# Patient Record
Sex: FEMALE | Race: BLACK OR AFRICAN AMERICAN | NOT HISPANIC OR LATINO | Employment: FULL TIME | URBAN - METROPOLITAN AREA
[De-identification: names, ages, dates, MRNs, and addresses within clinical notes are randomized per-mention and may not be internally consistent; named-entity substitution may affect disease eponyms.]

---

## 2023-02-24 PROBLEM — G89.29 CHRONIC PAIN OF RIGHT KNEE: Status: ACTIVE | Noted: 2023-02-24

## 2023-02-24 PROBLEM — M13.0 POLYARTHROPATHY OR POLYARTHRITIS, HAND: Status: ACTIVE | Noted: 2023-02-24

## 2023-02-24 PROBLEM — G62.9 NEUROPATHY: Status: ACTIVE | Noted: 2023-02-24

## 2023-02-24 PROBLEM — I49.9 IRREGULAR HEART BEATS: Status: ACTIVE | Noted: 2023-02-24

## 2023-02-24 PROBLEM — N04.9 NEPHROTIC SYNDROME: Status: ACTIVE | Noted: 2023-02-24

## 2023-02-24 PROBLEM — M35.00 SJOGREN'S SYNDROME (MULTI): Status: ACTIVE | Noted: 2023-02-24

## 2023-02-24 PROBLEM — R93.89 ENDOMETRIAL THICKENING ON ULTRASOUND: Status: ACTIVE | Noted: 2023-02-24

## 2023-02-24 PROBLEM — R43.0 ANOSMIA: Status: ACTIVE | Noted: 2023-02-24

## 2023-02-24 PROBLEM — H04.123 DRY EYE SYNDROME OF BOTH LACRIMAL GLANDS: Status: ACTIVE | Noted: 2023-02-24

## 2023-02-24 PROBLEM — L65.9 ALOPECIA: Status: ACTIVE | Noted: 2023-02-24

## 2023-02-24 PROBLEM — M25.552 BILATERAL HIP PAIN: Status: ACTIVE | Noted: 2023-02-24

## 2023-02-24 PROBLEM — H92.03 OTALGIA OF BOTH EARS: Status: ACTIVE | Noted: 2023-02-24

## 2023-02-24 PROBLEM — N05.2 MEMBRANOUS GLOMERULONEPHRITIS: Status: ACTIVE | Noted: 2023-02-24

## 2023-02-24 PROBLEM — H52.13 MYOPIA OF BOTH EYES WITH ASTIGMATISM: Status: ACTIVE | Noted: 2023-02-24

## 2023-02-24 PROBLEM — M25.551 BILATERAL HIP PAIN: Status: ACTIVE | Noted: 2023-02-24

## 2023-02-24 PROBLEM — R05.3 CHRONIC COUGH: Status: ACTIVE | Noted: 2023-02-24

## 2023-02-24 PROBLEM — N81.89 PELVIC FLOOR WEAKNESS: Status: ACTIVE | Noted: 2023-02-24

## 2023-02-24 PROBLEM — R35.0 FREQUENCY OF URINATION: Status: ACTIVE | Noted: 2023-02-24

## 2023-02-24 PROBLEM — M32.9 SLE (SYSTEMIC LUPUS ERYTHEMATOSUS) (MULTI): Status: ACTIVE | Noted: 2023-02-24

## 2023-02-24 PROBLEM — N88.4 CERVICAL HYPERTROPHIC ELONGATION: Status: ACTIVE | Noted: 2023-02-24

## 2023-02-24 PROBLEM — M25.561 CHRONIC PAIN OF RIGHT KNEE: Status: ACTIVE | Noted: 2023-02-24

## 2023-02-24 PROBLEM — R29.818 SUSPECTED SLEEP APNEA: Status: ACTIVE | Noted: 2023-02-24

## 2023-02-24 PROBLEM — M46.1 SACROILIITIS (CMS-HCC): Status: ACTIVE | Noted: 2023-02-24

## 2023-02-24 PROBLEM — E66.01 MORBID OBESITY WITH BMI OF 50.0-59.9, ADULT (MULTI): Status: ACTIVE | Noted: 2023-02-24

## 2023-02-24 PROBLEM — G25.81 RESTLESS LEG SYNDROME: Status: ACTIVE | Noted: 2023-02-24

## 2023-02-24 PROBLEM — H52.203 MYOPIA OF BOTH EYES WITH ASTIGMATISM: Status: ACTIVE | Noted: 2023-02-24

## 2023-02-24 PROBLEM — R07.89 CHEST DISCOMFORT: Status: ACTIVE | Noted: 2023-02-24

## 2023-02-24 PROBLEM — E55.9 VITAMIN D DEFICIENCY: Status: ACTIVE | Noted: 2023-02-24

## 2023-02-24 PROBLEM — F32.A DEPRESSION: Status: ACTIVE | Noted: 2023-02-24

## 2023-02-24 PROBLEM — R20.0 BILATERAL HAND NUMBNESS: Status: ACTIVE | Noted: 2023-02-24

## 2023-02-24 RX ORDER — CEVIMELINE HYDROCHLORIDE 30 MG/1
1 CAPSULE ORAL 2 TIMES DAILY
COMMUNITY
Start: 2016-08-11 | End: 2024-02-08 | Stop reason: SDUPTHER

## 2023-02-24 RX ORDER — TOPIRAMATE 100 MG/1
1 TABLET, FILM COATED ORAL NIGHTLY
COMMUNITY
Start: 2022-02-28 | End: 2023-05-31

## 2023-02-24 RX ORDER — SERTRALINE HYDROCHLORIDE 50 MG/1
1 TABLET, FILM COATED ORAL DAILY
COMMUNITY
Start: 2019-05-20 | End: 2023-03-16 | Stop reason: SDUPTHER

## 2023-02-24 RX ORDER — LISINOPRIL 20 MG/1
1 TABLET ORAL DAILY
COMMUNITY
Start: 2016-04-15 | End: 2023-03-16 | Stop reason: SDUPTHER

## 2023-02-24 RX ORDER — HYDROXYCHLOROQUINE SULFATE 200 MG/1
200 TABLET, FILM COATED ORAL 2 TIMES DAILY
COMMUNITY
Start: 2015-11-21 | End: 2024-03-04 | Stop reason: SDUPTHER

## 2023-03-15 NOTE — PROGRESS NOTES
"Subjective   Patient ID: Amanda Beverly is a 42 y.o. female who presents for Annual Exam.    Last Annual Physical: 1 year ago  Last Dental Visit: More than 1 year  Last Eye exam: Within the year  Hearing Concerns: No  Diet: Healthy  Exercise Routine: Regularly. Strength training and cardio     Last PAP: 2019. Cytology and HPV negative   Last Mammogram: Ordered      HPI   The patient is taking lisinopril for her hypertension and topamax for her chronic pain. She is taking these medications as prescribed and is tolerating it well. For her Lupus, sjogren's syndrome and sacroiliitis she is taking Planqinel and Cevimeline. The patient's depression is stable on the current dose of Sertraline. She complains of sleeping difficulty and adds that she normally wakes up during the night and is unable to go back to sleep.     Review of Systems  Constitutional: No fever or chills, No Night Sweats  Eyes: + Blurry Vision, Eye sight problems  ENT: No Nasal Discharge, Hoarseness, sore throat  Cardiovascular: no chest pain, no palpitations and no syncope.   Respiratory: no cough, no shortness of breath during exertion and no shortness of breath at rest.   Gastrointestinal: no abdominal pain, no nausea and no vomiting.   : No vaginal discharge, burning with urination, no blood in urine or stools  Skin: No Skin rashes or Lesions  Neuro: No Headache, no dizziness or Numbness or tingling  Psych: No Anxiety, depression, + sleeping problems  Heme: No Easy bleeding or brusing.     Objective   /77   Pulse 76   Ht 1.702 m (5' 7\")   Wt (!) 157 kg (346 lb)   LMP 03/01/2023 (Approximate)   SpO2 97%   BMI 54.19 kg/m²     Physical Exam  Patient declined Chaperone  Constitutional: Alert and in no acute distress. Well developed, well nourished.   Head and Face: Head and face: Normal.    Eyes: Normal external exam. Pupils were equal in size, round, reactive to light (PERRL) with normal accommodation and extraocular movements intact " (EOMI).   Ears, Nose, Mouth, and Throat: External inspection of ears and nose: Normal.  Hearing: Normal.  Nasal mucosa, septum, and turbinates: Normal.  Lips, teeth, and gums: Normal.  Oropharynx: Normal.   Neck: No neck mass was observed. Supple. Thyroid not enlarged and there were no palpable thyroid nodules.   Cardiovascular: Heart rate and rhythm were normal, normal S1 and S2. Pedal pulses: Normal. No peripheral edema.   Pulmonary: No respiratory distress. Clear bilateral breath sounds.   Breast: Normal Appearance, No Masses or lumps palpated  Abdomen: Soft nontender; no abdominal mass palpated. Normal bowel sounds. No organomegaly.   Musculoskeletal: No joint swelling seen, normal movements of all extremities. Range of motion: Normal.  Muscle strength/tone: Normal.    Skin: Normal skin color and pigmentation, normal skin turgor, and no rash.   Neurologic: Deep tendon reflexes were 2+ and symmetric.   Psychiatric: Judgment and insight: Intact. Mood and affect: Normal.  Lymphatic: No cervical lymphadenopathy. Palpation of lymph nodes in axillae: Normal.  Palpation of lymph nodes in groin: Normal.    Lab Results   Component Value Date    WBC 4.9 01/09/2023    HGB 12.6 01/09/2023    HCT 39.9 01/09/2023     01/09/2023    CHOL 156 09/03/2021    TRIG 118 09/03/2021    HDL 45.0 09/03/2021    ALT 18 01/09/2023    AST 20 01/09/2023     01/09/2023    K 4.4 01/09/2023     01/09/2023    CREATININE 0.98 01/09/2023    BUN 12 01/09/2023    CO2 31 01/09/2023    TSH 2.60 09/03/2021       Electrocardiogram 12 Lead  Normal sinus rhythm  Normal ECG  No previous ECGs available      Assessment/Plan   Diagnoses and all orders for this visit:  Morbid obesity with BMI of 50.0-59.9, adult (CMS/[facility])  -     Hemoglobin A1C; Future  -     Comprehensive Metabolic Panel; Future  -     Lipid Panel; Future  -     TSH with reflex to Free T4 if abnormal; Future  -     Follow Up In [facility] - PCP; Future  Annual  physical exam  Other forms of systemic lupus erythematosus, unspecified organ involvement status (CMS/HCC)  -     CBC; Future  -     Vitamin B12; Future  -     Vitamin D, Total; Future  Sjogren's syndrome with inflammatory arthritis (CMS/HCC)  Sacroiliitis (CMS/HCC)  -     sertraline (Zoloft) 50 mg tablet; Take 1 tablet (50 mg) by mouth once daily.  Other depression  Bilateral hip pain  Breast screening  -     BI mammo bilateral screening tomosynthesis; Future  Nephrotic syndrome  -     Albumin , Urine Random; Future  -     lisinopril 20 mg tablet; Take 1 tablet (20 mg) by mouth once daily.        Dear Amanda Beverly     It was my pleasure to take care of you today in the office. Below are the things we discussed today:    1. 1. Immunizations: Yearly Flu shot is recommended. Up to date         a: COVID: Please get booster from the pharmacy         b: Tetanus: Up-to-date    2. Blood Work: Ordered   3. Seen your dentist twice a year  4. Yearly Eye exam is recommended    5. BMI: Obese  6: Diet recommendations:   Eat Clean, Try to have as many home cooked meals as possible  Avoid processed foods which contain excess calories, sugar, and sodium.    7. Exercise recommendations:   150 minutes a week to maintain your weight     If you have to loose weight, you need a better diet and exercise plan.     8. Supplements recommended:  a - Calcium 600 mg up to twice a day to get a total of 1200 mg. Each 8 oz of milk or yogurt or 1 oz of cheese, 1 Banana, 1 serving of green Leafy vegetable has about 300 mg of Calcium, so you may subtract that amount. Calcium citrate is the only acceptable supplement to take if you take an acid suppressing medication like Prilosec; otherwise Calcium carbonate is acceptable too (It can cause Constipation).   b - Vitamin D - 2000 IU daily     9. Please get your Living will / Advance directive completed if you do not have one already. Please make sure our office has a copy of the latest one.     10.  Mammogram: Ordered  11. PAP: Up to date. Cytology and HPV negative     12. Depression: Continue Sertraline.    13. Hypertension: Continue Lisinopril.    14. Lupus, sjogren's syndrome and sacroiliitis: Continue Plaquenil and Cevimeline.      15. Chronic pain: Continue Topamax.    16. Sleeping issues: Advised the patient to take Melatonin when she wakes up during the night and is unable to fall asleep.    Follow up in 6 months.    Follow up in one year for a Physical. Please call the office before your Physical to see if you need blood work completed prior to your physical.     Please call me if any questions arise from now until your next visit. I will call you after I am done seeing patients. A Doctor is always available by phone when the office is closed. Please feel free to call for help with any problem that you feel shouldn't wait until the office re-opens.     Scribe Attestation  By signing my name below, IElizabeth Scribe   attest that this documentation has been prepared under the direction and in the presence of Bee Arellano MD.

## 2023-03-16 ENCOUNTER — OFFICE VISIT (OUTPATIENT)
Dept: PRIMARY CARE | Facility: CLINIC | Age: 43
End: 2023-03-16
Payer: COMMERCIAL

## 2023-03-16 VITALS
SYSTOLIC BLOOD PRESSURE: 127 MMHG | HEIGHT: 67 IN | HEART RATE: 76 BPM | WEIGHT: 293 LBS | BODY MASS INDEX: 45.99 KG/M2 | OXYGEN SATURATION: 97 % | DIASTOLIC BLOOD PRESSURE: 77 MMHG

## 2023-03-16 DIAGNOSIS — M32.8 OTHER FORMS OF SYSTEMIC LUPUS ERYTHEMATOSUS, UNSPECIFIED ORGAN INVOLVEMENT STATUS (MULTI): ICD-10-CM

## 2023-03-16 DIAGNOSIS — N04.9 NEPHROTIC SYNDROME: ICD-10-CM

## 2023-03-16 DIAGNOSIS — Z12.39 BREAST SCREENING: ICD-10-CM

## 2023-03-16 DIAGNOSIS — Z00.00 ANNUAL PHYSICAL EXAM: ICD-10-CM

## 2023-03-16 DIAGNOSIS — F32.89 OTHER DEPRESSION: ICD-10-CM

## 2023-03-16 DIAGNOSIS — M35.05 SJOGREN'S SYNDROME WITH INFLAMMATORY ARTHRITIS (MULTI): ICD-10-CM

## 2023-03-16 DIAGNOSIS — E66.01 MORBID OBESITY WITH BMI OF 50.0-59.9, ADULT (MULTI): Primary | ICD-10-CM

## 2023-03-16 DIAGNOSIS — M25.552 BILATERAL HIP PAIN: ICD-10-CM

## 2023-03-16 DIAGNOSIS — M46.1 SACROILIITIS (CMS-HCC): ICD-10-CM

## 2023-03-16 DIAGNOSIS — M25.551 BILATERAL HIP PAIN: ICD-10-CM

## 2023-03-16 PROBLEM — R07.89 CHEST DISCOMFORT: Status: RESOLVED | Noted: 2023-02-24 | Resolved: 2023-03-16

## 2023-03-16 PROBLEM — R05.3 CHRONIC COUGH: Status: RESOLVED | Noted: 2023-02-24 | Resolved: 2023-03-16

## 2023-03-16 PROBLEM — I49.9 IRREGULAR HEART BEATS: Status: RESOLVED | Noted: 2023-02-24 | Resolved: 2023-03-16

## 2023-03-16 PROCEDURE — 99396 PREV VISIT EST AGE 40-64: CPT | Performed by: FAMILY MEDICINE

## 2023-03-16 PROCEDURE — 1036F TOBACCO NON-USER: CPT | Performed by: FAMILY MEDICINE

## 2023-03-16 PROCEDURE — 3008F BODY MASS INDEX DOCD: CPT | Performed by: FAMILY MEDICINE

## 2023-03-16 RX ORDER — SERTRALINE HYDROCHLORIDE 50 MG/1
50 TABLET, FILM COATED ORAL DAILY
Qty: 90 TABLET | Refills: 1 | Status: SHIPPED | OUTPATIENT
Start: 2023-03-16 | End: 2023-09-19 | Stop reason: SDUPTHER

## 2023-03-16 RX ORDER — LISINOPRIL 20 MG/1
20 TABLET ORAL DAILY
Qty: 90 TABLET | Refills: 1 | Status: SHIPPED | OUTPATIENT
Start: 2023-03-16 | End: 2023-09-19 | Stop reason: SDUPTHER

## 2023-03-16 ASSESSMENT — PATIENT HEALTH QUESTIONNAIRE - PHQ9
1. LITTLE INTEREST OR PLEASURE IN DOING THINGS: NOT AT ALL
SUM OF ALL RESPONSES TO PHQ9 QUESTIONS 1 AND 2: 0
2. FEELING DOWN, DEPRESSED OR HOPELESS: NOT AT ALL

## 2023-03-16 ASSESSMENT — ENCOUNTER SYMPTOMS
OCCASIONAL FEELINGS OF UNSTEADINESS: 0
LOSS OF SENSATION IN FEET: 0
DEPRESSION: 0

## 2023-03-16 ASSESSMENT — COLUMBIA-SUICIDE SEVERITY RATING SCALE - C-SSRS
1. IN THE PAST MONTH, HAVE YOU WISHED YOU WERE DEAD OR WISHED YOU COULD GO TO SLEEP AND NOT WAKE UP?: NO
6. HAVE YOU EVER DONE ANYTHING, STARTED TO DO ANYTHING, OR PREPARED TO DO ANYTHING TO END YOUR LIFE?: NO
2. HAVE YOU ACTUALLY HAD ANY THOUGHTS OF KILLING YOURSELF?: NO

## 2023-04-13 ENCOUNTER — LAB (OUTPATIENT)
Dept: LAB | Facility: LAB | Age: 43
End: 2023-04-13
Payer: COMMERCIAL

## 2023-04-13 DIAGNOSIS — M32.8 OTHER FORMS OF SYSTEMIC LUPUS ERYTHEMATOSUS, UNSPECIFIED ORGAN INVOLVEMENT STATUS (MULTI): ICD-10-CM

## 2023-04-13 DIAGNOSIS — N04.9 NEPHROTIC SYNDROME: ICD-10-CM

## 2023-04-13 DIAGNOSIS — E66.01 MORBID OBESITY WITH BMI OF 50.0-59.9, ADULT (MULTI): ICD-10-CM

## 2023-04-13 LAB
FIBRIN D-DIMER (NG/ML FEU) IN PLATELET POOR PLASMA: 400 NG/ML FEU
NATRIURETIC PEPTIDE B (PG/ML) IN SER/PLAS: 5 PG/ML (ref 0–99)
TROPONIN I, HIGH SENSITIVITY: <3 NG/L (ref 0–34)

## 2023-04-13 PROCEDURE — 82043 UR ALBUMIN QUANTITATIVE: CPT

## 2023-04-13 PROCEDURE — 82570 ASSAY OF URINE CREATININE: CPT

## 2023-04-13 PROCEDURE — 85027 COMPLETE CBC AUTOMATED: CPT

## 2023-04-13 PROCEDURE — 83036 HEMOGLOBIN GLYCOSYLATED A1C: CPT

## 2023-04-13 PROCEDURE — 82607 VITAMIN B-12: CPT

## 2023-04-13 PROCEDURE — 80061 LIPID PANEL: CPT

## 2023-04-13 PROCEDURE — 80053 COMPREHEN METABOLIC PANEL: CPT

## 2023-04-13 PROCEDURE — 84443 ASSAY THYROID STIM HORMONE: CPT

## 2023-04-13 PROCEDURE — 82306 VITAMIN D 25 HYDROXY: CPT

## 2023-04-13 PROCEDURE — 36415 COLL VENOUS BLD VENIPUNCTURE: CPT

## 2023-04-14 LAB
ALANINE AMINOTRANSFERASE (SGPT) (U/L) IN SER/PLAS: 16 U/L (ref 7–45)
ALBUMIN (G/DL) IN SER/PLAS: 4.1 G/DL (ref 3.4–5)
ALBUMIN (MG/L) IN URINE: 53.2 MG/L
ALBUMIN/CREATININE (UG/MG) IN URINE: 40.9 UG/MG CRT (ref 0–30)
ALKALINE PHOSPHATASE (U/L) IN SER/PLAS: 47 U/L (ref 33–110)
ANION GAP IN SER/PLAS: 14 MMOL/L (ref 10–20)
ASPARTATE AMINOTRANSFERASE (SGOT) (U/L) IN SER/PLAS: 16 U/L (ref 9–39)
BILIRUBIN TOTAL (MG/DL) IN SER/PLAS: 0.8 MG/DL (ref 0–1.2)
CALCIDIOL (25 OH VITAMIN D3) (NG/ML) IN SER/PLAS: 33 NG/ML
CALCIUM (MG/DL) IN SER/PLAS: 9.7 MG/DL (ref 8.6–10.6)
CARBON DIOXIDE, TOTAL (MMOL/L) IN SER/PLAS: 25 MMOL/L (ref 21–32)
CHLORIDE (MMOL/L) IN SER/PLAS: 105 MMOL/L (ref 98–107)
CHOLESTEROL (MG/DL) IN SER/PLAS: 147 MG/DL (ref 0–199)
CHOLESTEROL IN HDL (MG/DL) IN SER/PLAS: 47.5 MG/DL
CHOLESTEROL/HDL RATIO: 3.1
COBALAMIN (VITAMIN B12) (PG/ML) IN SER/PLAS: 670 PG/ML (ref 211–911)
CREATININE (MG/DL) IN SER/PLAS: 0.87 MG/DL (ref 0.5–1.05)
CREATININE (MG/DL) IN URINE: 130 MG/DL (ref 20–320)
ERYTHROCYTE DISTRIBUTION WIDTH (RATIO) BY AUTOMATED COUNT: 11.4 % (ref 11.5–14.5)
ERYTHROCYTE MEAN CORPUSCULAR HEMOGLOBIN CONCENTRATION (G/DL) BY AUTOMATED: 30.9 G/DL (ref 32–36)
ERYTHROCYTE MEAN CORPUSCULAR VOLUME (FL) BY AUTOMATED COUNT: 97 FL (ref 80–100)
ERYTHROCYTES (10*6/UL) IN BLOOD BY AUTOMATED COUNT: 4.4 X10E12/L (ref 4–5.2)
ESTIMATED AVERAGE GLUCOSE FOR HBA1C: 108 MG/DL
GFR FEMALE: 84 ML/MIN/1.73M2
GLUCOSE (MG/DL) IN SER/PLAS: 111 MG/DL (ref 74–99)
HEMATOCRIT (%) IN BLOOD BY AUTOMATED COUNT: 42.7 % (ref 36–46)
HEMOGLOBIN (G/DL) IN BLOOD: 13.2 G/DL (ref 12–16)
HEMOGLOBIN A1C/HEMOGLOBIN TOTAL IN BLOOD: 5.4 %
LDL: 86 MG/DL (ref 0–99)
LEUKOCYTES (10*3/UL) IN BLOOD BY AUTOMATED COUNT: 4.9 X10E9/L (ref 4.4–11.3)
NRBC (PER 100 WBCS) BY AUTOMATED COUNT: 0 /100 WBC (ref 0–0)
PLATELETS (10*3/UL) IN BLOOD AUTOMATED COUNT: 235 X10E9/L (ref 150–450)
POTASSIUM (MMOL/L) IN SER/PLAS: 4.7 MMOL/L (ref 3.5–5.3)
PROTEIN TOTAL: 7.3 G/DL (ref 6.4–8.2)
SODIUM (MMOL/L) IN SER/PLAS: 139 MMOL/L (ref 136–145)
THYROTROPIN (MIU/L) IN SER/PLAS BY DETECTION LIMIT <= 0.05 MIU/L: 1.38 MIU/L (ref 0.44–3.98)
TRIGLYCERIDE (MG/DL) IN SER/PLAS: 69 MG/DL (ref 0–149)
UREA NITROGEN (MG/DL) IN SER/PLAS: 11 MG/DL (ref 6–23)
VLDL: 14 MG/DL (ref 0–40)

## 2023-05-03 ENCOUNTER — TELEPHONE (OUTPATIENT)
Dept: PRIMARY CARE | Facility: CLINIC | Age: 43
End: 2023-05-03

## 2023-05-03 NOTE — TELEPHONE ENCOUNTER
Patient needs a letter of excuse for work for COVID condition. Letter has to be faxed to [phone]; ATTN: Adri Witt. Please advise.

## 2023-05-03 NOTE — LETTER
May 3, 2023    Patient:           Amanda Beverly  YOB: 1980    From [facility]:    Return to work note PATIENTS WITH SUSPECTED or CONFIRMED COVID-19    Amanda FRANK Rush  Date of Evaluation: 04/27/2023    Centers for Disease Control and Prevention (CDC) Criteria to discontinue home isolation and return to work:    FIVE DAYS SINCE SYMPTOMS STARTED    AND    ONE DAY of NO FEVER without the use of fever-reducing medicines like acetaminophen (Tylenol) or Ibuprofen (Motrin or Advil).    AND    Either WITHOUT SYMPTOMS or WITH IMPROVING SYMPTOMS     must continue to wear a mask around other people for a full 10 days, even if allowed to return to work.    The earliest  can return to work is 05/0/2023.    If you are able to test  with antigen tests, and  has two sequential NEGATIVE tests 48 hours apart, then  may remove mask before day 10.     CDC recommends an isolation period of at least 10 days for people who cannot return to work after 5 days and up to 20 days for people who are severely ill with COVID-19 and for the people with weakened immune systems. Consult with your healthcare provider about when you can resume being around other people.    Please check the CDC website for the latest information as the criteria for home quarantine and guidelines for home isolation are changing frequently:     https://www.cdc.gov/coronavirus/2019-ncov/your-health/isolation.html     Bee Arellano MD

## 2023-05-08 ENCOUNTER — TELEPHONE (OUTPATIENT)
Dept: PRIMARY CARE | Facility: CLINIC | Age: 43
End: 2023-05-08

## 2023-05-08 ENCOUNTER — OFFICE VISIT (OUTPATIENT)
Dept: PRIMARY CARE | Facility: CLINIC | Age: 43
End: 2023-05-08
Payer: COMMERCIAL

## 2023-05-08 VITALS
OXYGEN SATURATION: 95 % | SYSTOLIC BLOOD PRESSURE: 129 MMHG | HEART RATE: 84 BPM | BODY MASS INDEX: 45.99 KG/M2 | WEIGHT: 293 LBS | DIASTOLIC BLOOD PRESSURE: 78 MMHG | HEIGHT: 67 IN

## 2023-05-08 DIAGNOSIS — M46.1 SACROILIITIS (CMS-HCC): Primary | ICD-10-CM

## 2023-05-08 DIAGNOSIS — M32.8 OTHER FORMS OF SYSTEMIC LUPUS ERYTHEMATOSUS, UNSPECIFIED ORGAN INVOLVEMENT STATUS (MULTI): ICD-10-CM

## 2023-05-08 PROCEDURE — 1036F TOBACCO NON-USER: CPT | Performed by: FAMILY MEDICINE

## 2023-05-08 PROCEDURE — 99213 OFFICE O/P EST LOW 20 MIN: CPT | Performed by: FAMILY MEDICINE

## 2023-05-08 PROCEDURE — 3008F BODY MASS INDEX DOCD: CPT | Performed by: FAMILY MEDICINE

## 2023-05-08 ASSESSMENT — ENCOUNTER SYMPTOMS
SORE THROAT: 1
NECK PAIN: 0
SINUS PAIN: 0
DEPRESSION: 0
COUGH: 0
OCCASIONAL FEELINGS OF UNSTEADINESS: 0
LOSS OF SENSATION IN FEET: 0

## 2023-05-08 NOTE — LETTER
May 8, 2023     Patient: Amanda Beverly   YOB: 1980   Date of Visit: 5/8/2023       To Whom It May Concern:    Amanda Beverly was seen in my clinic on 5/8/2023 at 12:00 pm. Please excuse  for her absence from work on this day to make the appointment and she still needs some time off and will return to work on 5/12/2023    If you have any questions or concerns, please don't hesitate to call.         Sincerely,         Bee Arellano MD        CC: No Recipients

## 2023-05-08 NOTE — PROGRESS NOTES
"Subjective   Patient ID: Amanda Beverly is a 42 y.o. female who presents for No chief complaint on file..    URI   This is a new problem. The current episode started in the past 7 days. The problem has been gradually improving. There has been no fever. Associated symptoms include a sore throat. Pertinent negatives include no coughing, neck pain, sinus pain or sneezing. She has tried nothing for the symptoms.    Taking Mucinex and feels her chest is better, tired and wants to take few more day off work    Review of Systems  Constitutional: No fever or chills  Cardiovascular: no chest pain, no palpitations and no syncope.   Respiratory: no cough, no shortness of breath during exertion and no shortness of breath at rest.   Gastrointestinal: no abdominal pain, no nausea and no vomiting.  Neuro: No Headache, no dizziness    Objective   /78   Pulse 84   Ht 1.702 m (5' 7\")   Wt (!) 159 kg (350 lb)   SpO2 95%   BMI 54.82 kg/m²     Physical Exam  Constitutional: Alert and in no acute distress. Well developed, well nourished  Head and Face: Head and face: Normal.    Cardiovascular: Heart rate and rhythm were normal, normal S1 and S2. No peripheral edema.   Pulmonary: No respiratory distress. Clear bilateral breath sounds.  Musculoskeletal: Gait and station: Normal. Muscle strength/tone: Normal.   Skin: Normal skin color and pigmentation, normal skin turgor, and no rash.    Psychiatric: Judgment and insight: Intact. Mood and affect: Normal.    Lab Results   Component Value Date    WBC 4.9 04/13/2023    HGB 13.2 04/13/2023    HCT 42.7 04/13/2023     04/13/2023    CHOL 147 04/13/2023    TRIG 69 04/13/2023    HDL 47.5 04/13/2023    ALT 16 04/13/2023    AST 16 04/13/2023     04/13/2023    K 4.7 04/13/2023     04/13/2023    CREATININE 0.87 04/13/2023    BUN 11 04/13/2023    CO2 25 04/13/2023    TSH 1.38 04/13/2023    HGBA1C 5.4 04/13/2023       Electrocardiogram 12 Lead  Normal sinus rhythm  Normal " ECG  No previous ECGs available      Assessment/Plan   Diagnoses and all orders for this visit:  Sacroiliitis (CMS/HCC)  Other forms of systemic lupus erythematosus, unspecified organ involvement status (CMS/[facility])        Dear Amanda Beverly     It was my pleasure to take care of you today in the office. Below are the things we discussed today:    1. COVID infection - recovering well, will consider restarting work in 4 days, will rest in the meantime.     2. SLE - restart Plaquenil and Evoxac.     3. HTN - Continue Lisinopril       Follow up in 5 months     Your yearly Physical is due in: March 2024  When you call the office for your yearly Physical, please ask them to inform me to order your blood work, so that you can get the fasting blood work before your appointment and we can discuss the results at your physical.      Please call me if any questions arise from now until your next visit. I will call you after I am done seeing patients. A Doctor is always available by phone when the office is closed. Please feel free to call for help with any problem that you feel shouldn't wait until the office re-opens.     Bee Arellano MD

## 2023-05-08 NOTE — LETTER
May 12, 2023     Patient: Amanda Beverly   YOB: 1980   Date of Visit: 5/8/2023       To Whom It May Concern:    It is my medical opinion that Amanda Beverly may return to work on but only work from home until 6/1/2023 .    If you have any questions or concerns, please don't hesitate to call.         Sincerely,        Shirley Boyle MA    CC: No Recipients

## 2023-07-19 DIAGNOSIS — G62.9 NEUROPATHY: ICD-10-CM

## 2023-07-19 DIAGNOSIS — M46.1 SACROILIITIS (CMS-HCC): ICD-10-CM

## 2023-07-19 RX ORDER — TOPIRAMATE 100 MG/1
TABLET, FILM COATED ORAL
Qty: 90 TABLET | Refills: 0 | Status: SHIPPED | OUTPATIENT
Start: 2023-07-19 | End: 2023-09-19 | Stop reason: SDUPTHER

## 2023-07-20 ENCOUNTER — TELEPHONE (OUTPATIENT)
Dept: PRIMARY CARE | Facility: CLINIC | Age: 43
End: 2023-07-20

## 2023-07-20 DIAGNOSIS — M32.8 OTHER FORMS OF SYSTEMIC LUPUS ERYTHEMATOSUS, UNSPECIFIED ORGAN INVOLVEMENT STATUS (MULTI): Primary | ICD-10-CM

## 2023-07-20 DIAGNOSIS — M25.551 BILATERAL HIP PAIN: ICD-10-CM

## 2023-07-20 DIAGNOSIS — M46.1 SACROILIITIS (CMS-HCC): ICD-10-CM

## 2023-07-20 DIAGNOSIS — M25.561 CHRONIC PAIN OF RIGHT KNEE: ICD-10-CM

## 2023-07-20 DIAGNOSIS — G89.29 CHRONIC PAIN OF RIGHT KNEE: ICD-10-CM

## 2023-07-20 DIAGNOSIS — M25.552 BILATERAL HIP PAIN: ICD-10-CM

## 2023-09-19 ENCOUNTER — LAB (OUTPATIENT)
Dept: LAB | Facility: LAB | Age: 43
End: 2023-09-19
Payer: COMMERCIAL

## 2023-09-19 ENCOUNTER — OFFICE VISIT (OUTPATIENT)
Dept: PRIMARY CARE | Facility: CLINIC | Age: 43
End: 2023-09-19
Payer: COMMERCIAL

## 2023-09-19 VITALS
BODY MASS INDEX: 45.99 KG/M2 | DIASTOLIC BLOOD PRESSURE: 77 MMHG | HEART RATE: 79 BPM | WEIGHT: 293 LBS | SYSTOLIC BLOOD PRESSURE: 117 MMHG | OXYGEN SATURATION: 94 % | HEIGHT: 67 IN

## 2023-09-19 DIAGNOSIS — M32.8 OTHER FORMS OF SYSTEMIC LUPUS ERYTHEMATOSUS, UNSPECIFIED ORGAN INVOLVEMENT STATUS (MULTI): ICD-10-CM

## 2023-09-19 DIAGNOSIS — F32.89 OTHER DEPRESSION: Primary | ICD-10-CM

## 2023-09-19 DIAGNOSIS — E66.01 MORBID OBESITY WITH BMI OF 50.0-59.9, ADULT (MULTI): ICD-10-CM

## 2023-09-19 DIAGNOSIS — M46.1 SACROILIITIS (CMS-HCC): ICD-10-CM

## 2023-09-19 DIAGNOSIS — N04.9 NEPHROTIC SYNDROME: ICD-10-CM

## 2023-09-19 DIAGNOSIS — G62.9 NEUROPATHY: ICD-10-CM

## 2023-09-19 DIAGNOSIS — Z23 ENCOUNTER FOR IMMUNIZATION: ICD-10-CM

## 2023-09-19 LAB
ALANINE AMINOTRANSFERASE (SGPT) (U/L) IN SER/PLAS: 14 U/L (ref 7–45)
ALBUMIN (G/DL) IN SER/PLAS: 4.1 G/DL (ref 3.4–5)
ALBUMIN (MG/L) IN URINE: 29.6 MG/L
ALBUMIN/CREATININE (UG/MG) IN URINE: 21.1 UG/MG CRT (ref 0–30)
ALKALINE PHOSPHATASE (U/L) IN SER/PLAS: 46 U/L (ref 33–110)
ANION GAP IN SER/PLAS: 12 MMOL/L (ref 10–20)
ASPARTATE AMINOTRANSFERASE (SGOT) (U/L) IN SER/PLAS: 17 U/L (ref 9–39)
BILIRUBIN TOTAL (MG/DL) IN SER/PLAS: 0.7 MG/DL (ref 0–1.2)
C REACTIVE PROTEIN (MG/L) IN SER/PLAS: 0.24 MG/DL
CALCIUM (MG/DL) IN SER/PLAS: 9.8 MG/DL (ref 8.6–10.6)
CARBON DIOXIDE, TOTAL (MMOL/L) IN SER/PLAS: 28 MMOL/L (ref 21–32)
CHLORIDE (MMOL/L) IN SER/PLAS: 106 MMOL/L (ref 98–107)
CREATININE (MG/DL) IN SER/PLAS: 1.09 MG/DL (ref 0.5–1.05)
CREATININE (MG/DL) IN URINE: 140 MG/DL (ref 20–320)
GFR FEMALE: 64 ML/MIN/1.73M2
GLUCOSE (MG/DL) IN SER/PLAS: 95 MG/DL (ref 74–99)
NATRIURETIC PEPTIDE B (PG/ML) IN SER/PLAS: 12 PG/ML (ref 0–99)
POTASSIUM (MMOL/L) IN SER/PLAS: 5 MMOL/L (ref 3.5–5.3)
PROTEIN TOTAL: 7.6 G/DL (ref 6.4–8.2)
SEDIMENTATION RATE, ERYTHROCYTE: 8 MM/H (ref 0–20)
SODIUM (MMOL/L) IN SER/PLAS: 141 MMOL/L (ref 136–145)
UREA NITROGEN (MG/DL) IN SER/PLAS: 16 MG/DL (ref 6–23)

## 2023-09-19 PROCEDURE — 90686 IIV4 VACC NO PRSV 0.5 ML IM: CPT | Performed by: FAMILY MEDICINE

## 2023-09-19 PROCEDURE — 83880 ASSAY OF NATRIURETIC PEPTIDE: CPT

## 2023-09-19 PROCEDURE — 90471 IMMUNIZATION ADMIN: CPT | Performed by: FAMILY MEDICINE

## 2023-09-19 PROCEDURE — 85652 RBC SED RATE AUTOMATED: CPT

## 2023-09-19 PROCEDURE — 3008F BODY MASS INDEX DOCD: CPT | Performed by: FAMILY MEDICINE

## 2023-09-19 PROCEDURE — 80053 COMPREHEN METABOLIC PANEL: CPT

## 2023-09-19 PROCEDURE — 86140 C-REACTIVE PROTEIN: CPT

## 2023-09-19 PROCEDURE — 1036F TOBACCO NON-USER: CPT | Performed by: FAMILY MEDICINE

## 2023-09-19 PROCEDURE — 82570 ASSAY OF URINE CREATININE: CPT

## 2023-09-19 PROCEDURE — 99214 OFFICE O/P EST MOD 30 MIN: CPT | Performed by: FAMILY MEDICINE

## 2023-09-19 PROCEDURE — 36415 COLL VENOUS BLD VENIPUNCTURE: CPT

## 2023-09-19 PROCEDURE — 82043 UR ALBUMIN QUANTITATIVE: CPT

## 2023-09-19 RX ORDER — SERTRALINE HYDROCHLORIDE 50 MG/1
50 TABLET, FILM COATED ORAL DAILY
Qty: 90 TABLET | Refills: 1 | Status: SHIPPED | OUTPATIENT
Start: 2023-09-19 | End: 2024-04-04 | Stop reason: SDUPTHER

## 2023-09-19 RX ORDER — LISINOPRIL 20 MG/1
20 TABLET ORAL DAILY
Qty: 90 TABLET | Refills: 1 | Status: SHIPPED | OUTPATIENT
Start: 2023-09-19 | End: 2024-04-04 | Stop reason: SDUPTHER

## 2023-09-19 RX ORDER — TOPIRAMATE 100 MG/1
100 TABLET, FILM COATED ORAL NIGHTLY
Qty: 90 TABLET | Refills: 1 | Status: SHIPPED | OUTPATIENT
Start: 2023-09-19

## 2023-09-19 NOTE — PROGRESS NOTES
"Subjective   Patient ID: Amanda Beverly is a 43 y.o. female who presents for Follow-up (6 month).    HPI   The patient reports that she is currently taking Plaquenil and Evoxac for lupus and Sjoregen's as prescribed by Dr. Navarro. She is also taking lisinopril for her hypertension, Zoloft for her depression and Topamax for her lower back pain. She is taking these medications as prescribed and is tolerating them well. She complains of bilateral leg swelling.     Review of Systems  Constitutional: No fever or chills  Cardiovascular: no chest pain, no palpitations and no syncope.   Respiratory: no cough, no shortness of breath during exertion and no shortness of breath at rest.   Gastrointestinal: no abdominal pain, no nausea and no vomiting.  Neuro: No Headache, no dizziness  Lymph: + bilateral leg swelling.    Objective   /77   Pulse 79   Ht 1.702 m (5' 7\")   Wt (!) 160 kg (353 lb)   SpO2 94%   BMI 55.29 kg/m²     Physical Exam  Constitutional: Alert and in no acute distress. Well developed, well nourished  Head and Face: Head and face: Normal.    Cardiovascular: Heart rate and rhythm were normal, normal S1 and S2. No peripheral edema.   Pulmonary: No respiratory distress. Clear bilateral breath sounds.  Musculoskeletal: Gait and station: Normal. Muscle strength/tone: Normal.   Skin: Normal skin color and pigmentation, normal skin turgor, and no rash.    Psychiatric: Judgment and insight: Intact. Mood and affect: Normal.    Lab Results   Component Value Date    WBC 4.9 04/13/2023    HGB 13.2 04/13/2023    HCT 42.7 04/13/2023     04/13/2023    CHOL 147 04/13/2023    TRIG 69 04/13/2023    HDL 47.5 04/13/2023    ALT 16 04/13/2023    AST 16 04/13/2023     04/13/2023    K 4.7 04/13/2023     04/13/2023    CREATININE 0.87 04/13/2023    BUN 11 04/13/2023    CO2 25 04/13/2023    TSH 1.38 04/13/2023    HGBA1C 5.4 04/13/2023       Electrocardiogram 12 Lead  Normal sinus rhythm  Normal ECG  No " previous ECGs available      Assessment/Plan   Diagnoses and all orders for this visit:  Other depression  Morbid obesity with BMI of 50.0-59.9, adult (CMS/[facility])  -     Follow Up In [facility] - PCP  Other forms of systemic lupus erythematosus, unspecified organ involvement status (CMS/[facility])  -     B-Type Natriuretic Peptide; Future  -     Comprehensive Metabolic Panel; Future  -     Albumin , Urine Random; Future  -     C-Reactive Protein; Future  -     Sedimentation Rate; Future  Sacroiliitis (CMS/[facility])  -     topiramate (Topamax) 100 mg tablet; Take 1 tablet (100 mg) by mouth once daily at bedtime.  -     sertraline (Zoloft) 50 mg tablet; Take 1 tablet (50 mg) by mouth once daily.  Neuropathy  -     topiramate (Topamax) 100 mg tablet; Take 1 tablet (100 mg) by mouth once daily at bedtime.  -     Follow Up In [facility] - PCP - Health Maintenance; Future  Nephrotic syndrome  -     lisinopril 20 mg tablet; Take 1 tablet (20 mg) by mouth once daily.  Encounter for immunization  -     Flu vaccine (IIV4) age 6 months and greater, preservative free        Dear Amanda Beverly     It was my pleasure to take care of you today in the office. Below are the things we discussed today:    1. SLE and Sjrogen's - Continue Plaquenil and Evoxac.      2. HTN - Continue Lisinopril .    3. Depression: Continue Zoloft.     4. Lower back pain: Continue Topamax.    5. Bilateral leg edema: Recommended that the patient use compression stockings when driving long distances. Letter given for work to allow the patient to work from home on some days due to long drive to and from work. Labs ordered.    6. Flu shot: Given today.    Your yearly Physical is due in: March 2024  When you call the office for your yearly Physical, please ask them to inform me to order your blood work, so that you can get the fasting blood work before your appointment and we can discuss the results at your physical.      Please call me if any  questions arise from now until your next visit. I will call you after I am done seeing patients. A Doctor is always available by phone when the office is closed. Please feel free to call for help with any problem that you feel shouldn't wait until the office re-opens.     Scribe Attestation  By signing my name below, I, Elizabeth Benavides, Bea   attest that this documentation has been prepared under the direction and in the presence of Bee Arellano MD.

## 2023-09-19 NOTE — LETTER
September 19, 2023     Patient: Amanda Beverly   YOB: 1980   Date of Visit: 9/19/2023       To Whom It May Concern:    It is my medical opinion that Amanda Beverly may return to work on 09/20/2023, but she is recommended to avoid dirving for> 20 mins for more than 2 times a week, Please accommodate her to work from home.  .    If you have any questions or concerns, please don't hesitate to call.         Sincerely,        Bee Arellano MD    CC: No Recipients

## 2023-10-13 ENCOUNTER — APPOINTMENT (OUTPATIENT)
Dept: RHEUMATOLOGY | Facility: CLINIC | Age: 43
End: 2023-10-13
Payer: COMMERCIAL

## 2023-10-17 ENCOUNTER — APPOINTMENT (OUTPATIENT)
Dept: RHEUMATOLOGY | Facility: CLINIC | Age: 43
End: 2023-10-17
Payer: COMMERCIAL

## 2023-10-18 ENCOUNTER — TELEMEDICINE (OUTPATIENT)
Dept: RHEUMATOLOGY | Facility: CLINIC | Age: 43
End: 2023-10-18
Payer: COMMERCIAL

## 2023-10-18 DIAGNOSIS — M35.05 SJOGREN'S SYNDROME WITH INFLAMMATORY ARTHRITIS (MULTI): ICD-10-CM

## 2023-10-18 DIAGNOSIS — M32.9 SYSTEMIC LUPUS ERYTHEMATOSUS, UNSPECIFIED SLE TYPE, UNSPECIFIED ORGAN INVOLVEMENT STATUS (MULTI): Primary | ICD-10-CM

## 2023-10-18 PROCEDURE — 99214 OFFICE O/P EST MOD 30 MIN: CPT | Performed by: STUDENT IN AN ORGANIZED HEALTH CARE EDUCATION/TRAINING PROGRAM

## 2023-10-18 RX ORDER — AZATHIOPRINE 50 MG/1
50 TABLET ORAL DAILY
Qty: 30 TABLET | Refills: 11 | Status: SHIPPED | OUTPATIENT
Start: 2023-10-18 | End: 2024-10-17

## 2023-10-18 NOTE — PROGRESS NOTES
"Subjective   Patient ID: Amanda Beverly is a 43 y.o. female who presents for fu due to elevated creatinine on labs and leg swelling that improves with decreasing the amount that she drives    HPI         Female. Renal biopsy 3/31/16 - membranous glomerulonephritis, consistent with class V lupus nephritis. JT 1:80, Anti SSA + (>8.0). Diagnosed in 2014, noticed protein in urine during pregnancy. At that time, patient was treated on steroids. After she was done giving birth, she was put on hydroxchloroquine. . Has had a right knee surgery for patella dislocation and for \" scraping off cartilage\" back in 1999.  Trialed azathioprine in 2023     Current immunosuppression:  Hydroxychloroquine 200 mg BID  Cevimeline BID  azathioprine 50 mg daily     In terms of sxs, she has a lot of fatigue. She is exercising and eating well and trying to lose weight. Sleep is poor. She is starting melatonin, She has less taste and smell since giving birth in 2014.      She has joint pain in her knees, hands. Right knee gives out sometimes. Also has hand stiffness. In the morning, it takes her 15-20 minutes to get up and move. Has alopecia that got worse after diagnosis. She is still actively losing hair. Gets nasal sores. Feels more sluggish in the sun. No malar rash. Has Rayanuds. Sees pain management who put her on topamax for lumbar stenosis.      Has dry eyes and dry mouth. Denies blood clots. has had 2 ectopic pregnancies. Her aunt had lupus.      Past Labs s/f: Positive JT 1:80, SSA>8 rest of NIMISHA neg including dsdna, Sm, RNP, Sm/RNP, SSB, Scl-70, centromere, Sophia-1, chromatin, ribosomal p, normal C3, normal C4, p anca 1:80, MPO neg, PR3 positive to 26, neg PLA2R, neg [id number], DRVVT, cardiolipin, neg Hep B Sag, neg Hep C Ab. TPMT normal     Past Imaging s/f L4-L5 diffuse disc bulge causing moderate spinal canal stenosis and neural foraminal narrowing at L4 an DJD, sacroiliac narrowing and sclerosis potentially c/f sacroillitis, " normal sized kidneys        Review of Systems:  Constitutional: No fever or chills  Cardiovascular: no chest pain, no palpitations and no syncope.   Respiratory: no cough, no shortness of breath during exertion and no shortness of breath at rest.   Gastrointestinal: no abdominal pain, no nausea and no vomiting.  Neuro: No Headache, no dizziness    Physical Exam:   Constitutional: Alert and in no acute distress. Well developed, well nourished.      Lab Results   Component Value Date    WBC 4.9 04/13/2023    HGB 13.2 04/13/2023    HCT 42.7 04/13/2023     04/13/2023    ALT 14 09/19/2023    AST 17 09/19/2023    CREATININE 1.09 (H) 09/19/2023       Electrocardiogram 12 Lead  Normal sinus rhythm  Normal ECG  No previous ECGs available      Assessment/Plan     #SLE  - Renal biopsy 3/31/16 - membranous glomerulonephritis, consistent with class V lupus nephritis. JT 1:80, Anti SSA + (>8.0). Diagnosed in 2014, noticed protein in urine during pregnancy  -continue  mg daily with yearly retinal screening- 1 year refilled on 3/16/2023  -Continue azathioprine 50 mg daily, started 2023-1-year refilled October 2023  -with next labs (can repeat around april or may), will repeat anca panel P anca 1:80 is nonspecific, curious that PR3 was mildly positive  -continue monitoring UA and Pr/Cr  -Has been referred to Derm for SLE alopecia     #Sjogrens Syndrome  -dx by SSA>8, dry eyes, dry mouth  -continue cevimeline- 1 year refilled 3/16/2023  -no B sxs, complements nl  -discussed higher lymphoma risk       #Leg swelling  -Unlikely due to lupus given normal ESR CRP, systemic symptoms, and improvement with decrease driving  -Regarding work note for decreased driving, will defer to PMD unless lupus becomes a etiology for swelling  -We will check lupus specific labs and urine  -If abnormal, will let patient know.  Also let her know if not concerning for lupus  Vaccine Recommendations:     From ACR 2022 vaccine recommendations:      For Rheumatic and musculoskeletal disease (RMD) patients aged greater than or equal to 65 years, and RMD patients aged >18 and <65 years who are on immunosuppressive medication, giving high-dose or adjuvanted influenza vaccination is conditionally  recommended over giving regular-dose influenza vaccination.     For patients with RMD aged <65 years who are on immunosuppressive medication, pneumococcal vaccination is strongly recommended.     For patients with RMD aged >18 years who are on immunosuppressive medication, administering the recombinant zoster vaccine is strongly recommended.     For patients with RMD aged >26 and <45 years who are on immunosuppressive medication and not previously vaccinated, vaccination against HPV is conditionally recommended.     Cardiovascular Recommendations:     Patients with inflammatory diseases are at high risk for cardiovascular disease, and should be aggressively screened by primary care physicians and started on lipid-lowering medications when appropriate.     Bone Health Recommendations:     Patients on steroids should be on calcium and Vitamin D. Patients with inflammatory rheumatic diseases are higher risk for osteoporosis and should have baseline DEXA screening.      Patient counseled to seek medical care if any new or worsening symptoms, urgently if needed.      Note will be d/w primary care doctor.     Return to clinic in1 week sooner if needed     Dragon dictation software was used to dictate this note. Errors may have occurred during dictation that was not intended by the user.               This Medical recommendation has been made based on the Telephonic/Video conversation and the history is given by the patient, which I as a Physician and the patient also understand that there are limitations given the lack of an in-person Physical Exam. Patient will call back if symptoms don't improve.    Adri Navarro MD

## 2023-10-27 ENCOUNTER — TELEPHONE (OUTPATIENT)
Dept: ORTHOPEDIC SURGERY | Facility: CLINIC | Age: 43
End: 2023-10-27
Payer: COMMERCIAL

## 2023-10-27 NOTE — TELEPHONE ENCOUNTER
Work needs another letter for working from home with a date saying she can return in the month of December.

## 2023-10-30 ENCOUNTER — TELEPHONE (OUTPATIENT)
Dept: PRIMARY CARE | Facility: CLINIC | Age: 43
End: 2023-10-30
Payer: COMMERCIAL

## 2024-01-24 ENCOUNTER — HOSPITAL ENCOUNTER (OUTPATIENT)
Dept: RADIOLOGY | Facility: CLINIC | Age: 44
Discharge: HOME | End: 2024-01-24
Payer: COMMERCIAL

## 2024-01-24 ENCOUNTER — OFFICE VISIT (OUTPATIENT)
Dept: RHEUMATOLOGY | Facility: CLINIC | Age: 44
End: 2024-01-24
Payer: COMMERCIAL

## 2024-01-24 ENCOUNTER — LAB (OUTPATIENT)
Dept: LAB | Facility: LAB | Age: 44
End: 2024-01-24
Payer: COMMERCIAL

## 2024-01-24 VITALS — HEART RATE: 77 BPM | SYSTOLIC BLOOD PRESSURE: 109 MMHG | DIASTOLIC BLOOD PRESSURE: 60 MMHG

## 2024-01-24 DIAGNOSIS — Z79.899 LONG-TERM USE OF PLAQUENIL: ICD-10-CM

## 2024-01-24 DIAGNOSIS — M25.551 RIGHT HIP PAIN: ICD-10-CM

## 2024-01-24 DIAGNOSIS — M32.14 SYSTEMIC LUPUS ERYTHEMATOSUS WITH GLOMERULAR DISEASE, UNSPECIFIED SLE TYPE (MULTI): ICD-10-CM

## 2024-01-24 DIAGNOSIS — M32.14 SYSTEMIC LUPUS ERYTHEMATOSUS WITH GLOMERULAR DISEASE, UNSPECIFIED SLE TYPE (MULTI): Primary | ICD-10-CM

## 2024-01-24 LAB
ALBUMIN SERPL BCP-MCNC: 4.3 G/DL (ref 3.4–5)
ALP SERPL-CCNC: 48 U/L (ref 33–110)
ALT SERPL W P-5'-P-CCNC: 10 U/L (ref 7–45)
ANION GAP SERPL CALC-SCNC: 11 MMOL/L (ref 10–20)
APPEARANCE UR: CLEAR
AST SERPL W P-5'-P-CCNC: 13 U/L (ref 9–39)
BASOPHILS # BLD AUTO: 0.02 X10*3/UL (ref 0–0.1)
BASOPHILS NFR BLD AUTO: 0.3 %
BILIRUB SERPL-MCNC: 0.8 MG/DL (ref 0–1.2)
BILIRUB UR STRIP.AUTO-MCNC: NEGATIVE MG/DL
BUN SERPL-MCNC: 17 MG/DL (ref 6–23)
C3 SERPL-MCNC: 155 MG/DL (ref 87–200)
C4 SERPL-MCNC: 63 MG/DL (ref 10–50)
CALCIUM SERPL-MCNC: 9.7 MG/DL (ref 8.6–10.6)
CHLORIDE SERPL-SCNC: 106 MMOL/L (ref 98–107)
CO2 SERPL-SCNC: 25 MMOL/L (ref 21–32)
COLOR UR: YELLOW
CREAT SERPL-MCNC: 1.02 MG/DL (ref 0.5–1.05)
CREAT UR-MCNC: 122.4 MG/DL (ref 20–320)
CRP SERPL-MCNC: 0.5 MG/DL
DSDNA AB SER-ACNC: <1 IU/ML
EGFRCR SERPLBLD CKD-EPI 2021: 70 ML/MIN/1.73M*2
EOSINOPHIL # BLD AUTO: 0.09 X10*3/UL (ref 0–0.7)
EOSINOPHIL NFR BLD AUTO: 1.5 %
ERYTHROCYTE [DISTWIDTH] IN BLOOD BY AUTOMATED COUNT: 11.3 % (ref 11.5–14.5)
ERYTHROCYTE [SEDIMENTATION RATE] IN BLOOD BY WESTERGREN METHOD: 32 MM/H (ref 0–20)
GLUCOSE SERPL-MCNC: 97 MG/DL (ref 74–99)
GLUCOSE UR STRIP.AUTO-MCNC: NEGATIVE MG/DL
HCT VFR BLD AUTO: 40.4 % (ref 36–46)
HGB BLD-MCNC: 13.1 G/DL (ref 12–16)
IMM GRANULOCYTES # BLD AUTO: 0.01 X10*3/UL (ref 0–0.7)
IMM GRANULOCYTES NFR BLD AUTO: 0.2 % (ref 0–0.9)
KETONES UR STRIP.AUTO-MCNC: NEGATIVE MG/DL
LEUKOCYTE ESTERASE UR QL STRIP.AUTO: NEGATIVE
LYMPHOCYTES # BLD AUTO: 2.11 X10*3/UL (ref 1.2–4.8)
LYMPHOCYTES NFR BLD AUTO: 35 %
MCH RBC QN AUTO: 30.1 PG (ref 26–34)
MCHC RBC AUTO-ENTMCNC: 32.4 G/DL (ref 32–36)
MCV RBC AUTO: 93 FL (ref 80–100)
MONOCYTES # BLD AUTO: 0.57 X10*3/UL (ref 0.1–1)
MONOCYTES NFR BLD AUTO: 9.5 %
NEUTROPHILS # BLD AUTO: 3.23 X10*3/UL (ref 1.2–7.7)
NEUTROPHILS NFR BLD AUTO: 53.5 %
NITRITE UR QL STRIP.AUTO: NEGATIVE
NRBC BLD-RTO: 0 /100 WBCS (ref 0–0)
PH UR STRIP.AUTO: 6 [PH]
PLATELET # BLD AUTO: 237 X10*3/UL (ref 150–450)
POTASSIUM SERPL-SCNC: 5.2 MMOL/L (ref 3.5–5.3)
PROT SERPL-MCNC: 7.8 G/DL (ref 6.4–8.2)
PROT UR STRIP.AUTO-MCNC: NEGATIVE MG/DL
PROT UR-ACNC: 11 MG/DL (ref 5–24)
PROT/CREAT UR: 0.09 MG/MG CREAT (ref 0–0.17)
RBC # BLD AUTO: 4.35 X10*6/UL (ref 4–5.2)
RBC # UR STRIP.AUTO: NEGATIVE /UL
SODIUM SERPL-SCNC: 137 MMOL/L (ref 136–145)
SP GR UR STRIP.AUTO: 1.01
UROBILINOGEN UR STRIP.AUTO-MCNC: <2 MG/DL
WBC # BLD AUTO: 6 X10*3/UL (ref 4.4–11.3)

## 2024-01-24 PROCEDURE — 36415 COLL VENOUS BLD VENIPUNCTURE: CPT

## 2024-01-24 PROCEDURE — 86036 ANCA SCREEN EACH ANTIBODY: CPT

## 2024-01-24 PROCEDURE — 84156 ASSAY OF PROTEIN URINE: CPT

## 2024-01-24 PROCEDURE — 85025 COMPLETE CBC W/AUTO DIFF WBC: CPT

## 2024-01-24 PROCEDURE — 1036F TOBACCO NON-USER: CPT | Performed by: STUDENT IN AN ORGANIZED HEALTH CARE EDUCATION/TRAINING PROGRAM

## 2024-01-24 PROCEDURE — 80220 DRUG ASY HYDROXYCHLOROQUINE: CPT

## 2024-01-24 PROCEDURE — 86140 C-REACTIVE PROTEIN: CPT

## 2024-01-24 PROCEDURE — 86225 DNA ANTIBODY NATIVE: CPT

## 2024-01-24 PROCEDURE — 85652 RBC SED RATE AUTOMATED: CPT

## 2024-01-24 PROCEDURE — 99215 OFFICE O/P EST HI 40 MIN: CPT | Performed by: STUDENT IN AN ORGANIZED HEALTH CARE EDUCATION/TRAINING PROGRAM

## 2024-01-24 PROCEDURE — 80053 COMPREHEN METABOLIC PANEL: CPT

## 2024-01-24 PROCEDURE — 81003 URINALYSIS AUTO W/O SCOPE: CPT

## 2024-01-24 PROCEDURE — 83516 IMMUNOASSAY NONANTIBODY: CPT

## 2024-01-24 PROCEDURE — 86160 COMPLEMENT ANTIGEN: CPT

## 2024-01-24 PROCEDURE — 73523 X-RAY EXAM HIPS BI 5/> VIEWS: CPT

## 2024-01-24 PROCEDURE — 3008F BODY MASS INDEX DOCD: CPT | Performed by: STUDENT IN AN ORGANIZED HEALTH CARE EDUCATION/TRAINING PROGRAM

## 2024-01-24 PROCEDURE — 82570 ASSAY OF URINE CREATININE: CPT

## 2024-01-24 NOTE — PATIENT INSTRUCTIONS
xRegarding your overall lupus, it seems to be still be quiet    The hip pain is unlikely related, but lets do this    Lets get xrays of your hips today- well do both so we can compare, because right is the one hurting you     With food, take ibuprofen 600 mg up to 4 times a day as needed for pain only for 2 weeks , but be liberal with it     Lets followup in 2 weeks if not better, you can make an appointment and always cancel it if you're doing good    Lets get lab and urine today

## 2024-01-24 NOTE — PROGRESS NOTES
"Subjective   Patient ID: Amanda Beverly is a 43 y.o. female who presents for fu due to elevated creatinine on labs and leg swelling that improves with decreasing the amount that she drives    HPI     Female. Renal biopsy 3/31/16 - membranous glomerulonephritis, consistent with class V lupus nephritis. JT 1:80, Anti SSA + (>8.0). Diagnosed in 2014, noticed protein in urine during pregnancy. At that time, patient was treated on steroids. After she was done giving birth, she was put on hydroxchloroquine. . Has had a right knee surgery for patella dislocation and for \" scraping off cartilage\" back in 1999.  Trialed azathioprine in 2023 Monday , patient startedhavinghippain, Tuesday, it was worse. The pain is okay when patient is seated. When she gets up, she has to do it very slow     Current immunosuppression:  Hydroxychloroquine 200 mg BID  Cevimeline BID  azathioprine 50 mg daily     In terms of sxs, she has a lot of fatigue. She has joint pain in her knees, hands. Right knee gives out sometimes. Also has hand stiffness. In the morning, it takes her 15-20 minutes to get up and move. Has alopecia that got worse after diagnosis. She is still actively losing hair. Gets nasal sores. Feels more sluggish in the sun. No malar rash. Has Rayanuds. Sees pain management who put her on topamax for lumbar stenosis.      Has dry eyes and dry mouth. Denies blood clots. has had 2 ectopic pregnancies. Her aunt had lupus.        Past Labs s/f: Positive JT 1:80, SSA>8 rest of NIMISHA neg including dsdna, Sm, RNP, Sm/RNP, SSB, Scl-70, centromere, Sophia-1, chromatin, ribosomal p, normal C3, normal C4, p anca 1:80, MPO neg, PR3 positive to 26, neg PLA2R, neg C0ighuveystcxt, DRVVT, cardiolipin, neg Hep B Sag, neg Hep C Ab. TPMT normal     Past Imaging s/f L4-L5 diffuse disc bulge causing moderate spinal canal stenosis and neural foraminal narrowing at L4 an DJD, sacroiliac narrowing and sclerosis potentially c/f sacroillitis, normal sized " kidneys  .             Physical Exam:   Constitutional: Alert and in no acute distress. Well developed, well nourished.  No synovitis  Right groin pain and pain with ROM      Lab Results   Component Value Date    WBC 4.9 04/13/2023    HGB 13.2 04/13/2023    HCT 42.7 04/13/2023     04/13/2023    ALT 14 09/19/2023    AST 17 09/19/2023    CREATININE 1.09 (H) 09/19/2023       Electrocardiogram 12 Lead  Normal sinus rhythm  Normal ECG  No previous ECGs available      Assessment/Plan     #SLE  - Renal biopsy 3/31/16 - membranous glomerulonephritis, consistent with class V lupus nephritis. JT 1:80, Anti SSA + (>8.0). Diagnosed in 2014, noticed protein in urine during pregnancy  -continue  mg daily with yearly retinal screening- 1 year refilled on 3/16/2023  -Continue azathioprine 50 mg daily, started 2023-1-year refilled October 2023  -with next labs ,will repeat anca panel P anca 1:80 is nonspecific, curious that PR3 was mildly positive  -continue monitoring UA and Pr/Cr  -Has been referred to Derm for SLE alopecia     #Sjogrens Syndrome  -dx by SSA>8, dry eyes, dry mouth  -continue cevimeline- 1 year refilled 3/16/2023  -no B sxs, complements nl  -discussed higher lymphoma risk    #Right hip pain with movement and groin pain  -I think this is likely OA  - xrays today  -With food, take ibuprofen 600 mg up to 4 times a day as needed for pain only for 2 weeks , but be liberal with it ; should not be on standing steroids due to hx of lupus nephritis, but short course is fine      From ACR 2022 vaccine recommendations:     For Rheumatic and musculoskeletal disease (RMD) patients aged greater than or equal to 65 years, and RMD patients aged >18 and <65 years who are on immunosuppressive medication, giving high-dose or adjuvanted influenza vaccination is conditionally  recommended over giving regular-dose influenza vaccination.     For patients with RMD aged <65 years who are on immunosuppressive medication,  pneumococcal vaccination is strongly recommended.     For patients with RMD aged >18 years who are on immunosuppressive medication, administering the recombinant zoster vaccine is strongly recommended.     For patients with RMD aged >26 and <45 years who are on immunosuppressive medication and not previously vaccinated, vaccination against HPV is conditionally recommended.     Cardiovascular Recommendations:     Patients with inflammatory diseases are at high risk for cardiovascular disease, and should be aggressively screened by primary care physicians and started on lipid-lowering medications when appropriate.     Bone Health Recommendations:     Patients on steroids should be on calcium and Vitamin D. Patients with inflammatory rheumatic diseases are higher risk for osteoporosis and should have baseline DEXA screening.      Patient counseled to seek medical care if any new or worsening symptoms, urgently if needed.      Note will  d/w primary care doctor.     Return to clinic in 2 wk, lab and xray today     Dragon dictation software was used to dictate this note. Errors may have occurred during dictation that was not intended by the user.

## 2024-01-27 LAB
ANCA AB PATTERN SER IF-IMP: ABNORMAL
ANCA IGG TITR SER IF: ABNORMAL {TITER}
MYELOPEROXIDASE AB SER-ACNC: 6 AU/ML (ref 0–19)
PROTEINASE3 AB SER-ACNC: 30 AU/ML (ref 0–19)

## 2024-01-31 LAB — SCAN RESULT: NORMAL

## 2024-02-08 ENCOUNTER — OFFICE VISIT (OUTPATIENT)
Dept: RHEUMATOLOGY | Facility: CLINIC | Age: 44
End: 2024-02-08
Payer: COMMERCIAL

## 2024-02-08 VITALS — DIASTOLIC BLOOD PRESSURE: 76 MMHG | HEART RATE: 77 BPM | SYSTOLIC BLOOD PRESSURE: 134 MMHG

## 2024-02-08 DIAGNOSIS — M32.9 SYSTEMIC LUPUS ERYTHEMATOSUS, UNSPECIFIED SLE TYPE, UNSPECIFIED ORGAN INVOLVEMENT STATUS (MULTI): ICD-10-CM

## 2024-02-08 DIAGNOSIS — M35.00 SJOGREN'S SYNDROME, WITH UNSPECIFIED ORGAN INVOLVEMENT (MULTI): Primary | ICD-10-CM

## 2024-02-08 PROCEDURE — 99214 OFFICE O/P EST MOD 30 MIN: CPT | Performed by: STUDENT IN AN ORGANIZED HEALTH CARE EDUCATION/TRAINING PROGRAM

## 2024-02-08 PROCEDURE — 1036F TOBACCO NON-USER: CPT | Performed by: STUDENT IN AN ORGANIZED HEALTH CARE EDUCATION/TRAINING PROGRAM

## 2024-02-08 PROCEDURE — 3008F BODY MASS INDEX DOCD: CPT | Performed by: STUDENT IN AN ORGANIZED HEALTH CARE EDUCATION/TRAINING PROGRAM

## 2024-02-08 RX ORDER — CEVIMELINE HYDROCHLORIDE 30 MG/1
30 CAPSULE ORAL 2 TIMES DAILY
Qty: 180 CAPSULE | Refills: 3 | Status: SHIPPED | OUTPATIENT
Start: 2024-02-08

## 2024-02-08 NOTE — PROGRESS NOTES
"Subjective   Patient ID: Amanda Beverly is a 43 y.o. female who presents for fu   Lupus       Female. Renal biopsy 3/31/16 - membranous glomerulonephritis, consistent with class V lupus nephritis. JT 1:80, Anti SSA + (>8.0). Diagnosed in 2014, noticed protein in urine during pregnancy. At that time, patient was treated on steroids. After she was done giving birth, she was put on hydroxchloroquine. . Has had a right knee surgery for patella dislocation and for \" scraping off cartilage\" back in 1999.  Trialed azathioprine in 2023    Patient had right hip pain flare for which she saw me on 1/24/2024; resolved after a few days with ibuprofen    Current immunosuppression:  Hydroxychloroquine 200 mg BID  Cevimeline BID  azathioprine 50 mg daily     In terms of sxs, she has a lot of fatigue. She has joint pain in her knees, hands. Right knee gives out sometimes. Also has hand stiffness. In the morning, it takes her 15-20 minutes to get up and move. Has alopecia that got worse after diagnosis. She is still actively losing hair. Gets nasal sores. Feels more sluggish in the sun. No malar rash. Has Rayanuds. Sees pain management who put her on topamax for lumbar stenosis.      Has dry eyes and dry mouth. Denies blood clots. has had 2 ectopic pregnancies. Her aunt had lupus.        Past Labs s/f: Positive JT 1:80, SSA>8 rest of NIMISHA neg including dsdna, Sm, RNP, Sm/RNP, SSB, Scl-70, centromere, Sophia-1, chromatin, ribosomal p, normal C3, normal C4, p anca 1:80, MPO neg, PR3 positive to 26, neg PLA2R, neg [id number], DRVVT, cardiolipin, neg Hep B Sag, neg Hep C Ab. TPMT normal     Past Imaging s/f L4-L5 diffuse disc bulge causing moderate spinal canal stenosis and neural foraminal narrowing at L4 an DJD, sacroiliac narrowing and sclerosis potentially c/f sacroillitis, normal sized kidneys  .             Physical Exam:   Constitutional: Alert and in no acute distress. Well developed, well nourished.        Lab Results "   Component Value Date    WBC 6.0 01/24/2024    HGB 13.1 01/24/2024    HCT 40.4 01/24/2024     01/24/2024    ALT 10 01/24/2024    AST 13 01/24/2024    CREATININE 1.02 01/24/2024       XR hips bilateral 5+ VW w pelvis when performed  Narrative: Interpreted By:  Gary Hernandez,   STUDY:  XR HIPS BILATERAL 5+ VW WITH PELVIS WHEN PERFORMED; ;  1/24/2024 4:03  pm      INDICATION:  Signs/Symptoms:weight bearing.      COMPARISON:  None.      ACCESSION NUMBER(S):  [id number]      ORDERING CLINICIAN:  JUDITH WERNER      FINDINGS:  No acute fracture or hip dislocation. Pelvic ring appears intact. No  lytic or blastic changes. No significant joint space narrowing.      Impression: Unremarkable hip radiographs.          MACRO:  None      Signed by: Gary Hernandez 1/25/2024 10:15 PM  Dictation workstation:   [id number]      Assessment/Plan     #SLE  - Renal biopsy 3/31/16 - membranous glomerulonephritis, consistent with class V lupus nephritis. JT 1:80, Anti SSA + (>8.0). Diagnosed in 2014, noticed protein in urine during pregnancy  -continue  mg daily with yearly retinal screening- 1 year refilled on 3/16/2023  -Continue azathioprine 50 mg daily, started 2023-1-year refilled October 2023  -with next labs ,will repeat anca panel P anca 1:80 is nonspecific, curious that PR3 was mildly positive  -continue monitoring UA and Pr/Cr  -Has been referred to Derm for SLE alopecia  -Family paperwork filled out February 8, 2024     #Sjogrens Syndrome  -dx by SSA>8, dry eyes, dry mouth  -continue cevimeline- 1 year refill 2/2024  -no B sxs, complements nl  -discussed higher lymphoma risk    #Right hip pain with movement and groin pain (resolved)  -Normal x-rays 2024, with likely tendinitis, resolved after few days of ibuprofen      From ACR 2022 vaccine recommendations:     For Rheumatic and musculoskeletal disease (RMD) patients aged greater than or equal to 65 years, and RMD patients aged >18 and <65 years who are on  immunosuppressive medication, giving high-dose or adjuvanted influenza vaccination is conditionally  recommended over giving regular-dose influenza vaccination.     For patients with RMD aged <65 years who are on immunosuppressive medication, pneumococcal vaccination is strongly recommended.     For patients with RMD aged >18 years who are on immunosuppressive medication, administering the recombinant zoster vaccine is strongly recommended.     For patients with RMD aged >26 and <45 years who are on immunosuppressive medication and not previously vaccinated, vaccination against HPV is conditionally recommended.     Cardiovascular Recommendations:     Patients with inflammatory diseases are at high risk for cardiovascular disease, and should be aggressively screened by primary care physicians and started on lipid-lowering medications when appropriate.     Bone Health Recommendations:     Patients on steroids should be on calcium and Vitamin D. Patients with inflammatory rheumatic diseases are higher risk for osteoporosis and should have baseline DEXA screening.      Patient counseled to seek medical care if any new or worsening symptoms, urgently if needed.      Note will  d/w primary care doctor.    RTC 4-6 mo, lab before, sooner if needed    Dragon dictation software was used to dictate this note. Errors may have occurred during dictation that was not intended by the user.

## 2024-03-04 ENCOUNTER — OFFICE VISIT (OUTPATIENT)
Dept: RHEUMATOLOGY | Facility: CLINIC | Age: 44
End: 2024-03-04
Payer: COMMERCIAL

## 2024-03-04 ENCOUNTER — LAB (OUTPATIENT)
Dept: LAB | Facility: LAB | Age: 44
End: 2024-03-04
Payer: COMMERCIAL

## 2024-03-04 VITALS — HEART RATE: 69 BPM | SYSTOLIC BLOOD PRESSURE: 124 MMHG | DIASTOLIC BLOOD PRESSURE: 82 MMHG

## 2024-03-04 DIAGNOSIS — R53.83 OTHER FATIGUE: ICD-10-CM

## 2024-03-04 DIAGNOSIS — M32.9 LUPUS (MULTI): ICD-10-CM

## 2024-03-04 DIAGNOSIS — M32.9 SYSTEMIC LUPUS ERYTHEMATOSUS, UNSPECIFIED SLE TYPE, UNSPECIFIED ORGAN INVOLVEMENT STATUS (MULTI): ICD-10-CM

## 2024-03-04 DIAGNOSIS — M32.9 LUPUS (MULTI): Primary | ICD-10-CM

## 2024-03-04 LAB
APPEARANCE UR: CLEAR
BASOPHILS # BLD AUTO: 0.02 X10*3/UL (ref 0–0.1)
BASOPHILS NFR BLD AUTO: 0.4 %
BILIRUB UR STRIP.AUTO-MCNC: NEGATIVE MG/DL
COLOR UR: NORMAL
CREAT UR-MCNC: 131.8 MG/DL (ref 20–320)
EOSINOPHIL # BLD AUTO: 0.08 X10*3/UL (ref 0–0.7)
EOSINOPHIL NFR BLD AUTO: 1.8 %
ERYTHROCYTE [DISTWIDTH] IN BLOOD BY AUTOMATED COUNT: 11.4 % (ref 11.5–14.5)
ERYTHROCYTE [SEDIMENTATION RATE] IN BLOOD BY WESTERGREN METHOD: 16 MM/H (ref 0–20)
GLUCOSE UR STRIP.AUTO-MCNC: NORMAL MG/DL
HCT VFR BLD AUTO: 40.5 % (ref 36–46)
HGB BLD-MCNC: 13 G/DL (ref 12–16)
IMM GRANULOCYTES # BLD AUTO: 0.01 X10*3/UL (ref 0–0.7)
IMM GRANULOCYTES NFR BLD AUTO: 0.2 % (ref 0–0.9)
KETONES UR STRIP.AUTO-MCNC: NEGATIVE MG/DL
LEUKOCYTE ESTERASE UR QL STRIP.AUTO: NEGATIVE
LYMPHOCYTES # BLD AUTO: 2.14 X10*3/UL (ref 1.2–4.8)
LYMPHOCYTES NFR BLD AUTO: 48 %
MCH RBC QN AUTO: 30.1 PG (ref 26–34)
MCHC RBC AUTO-ENTMCNC: 32.1 G/DL (ref 32–36)
MCV RBC AUTO: 94 FL (ref 80–100)
MONOCYTES # BLD AUTO: 0.44 X10*3/UL (ref 0.1–1)
MONOCYTES NFR BLD AUTO: 9.9 %
NEUTROPHILS # BLD AUTO: 1.77 X10*3/UL (ref 1.2–7.7)
NEUTROPHILS NFR BLD AUTO: 39.7 %
NITRITE UR QL STRIP.AUTO: NEGATIVE
NRBC BLD-RTO: 0 /100 WBCS (ref 0–0)
PH UR STRIP.AUTO: 5 [PH]
PLATELET # BLD AUTO: 277 X10*3/UL (ref 150–450)
PROT UR STRIP.AUTO-MCNC: NEGATIVE MG/DL
PROT UR-ACNC: 7 MG/DL (ref 5–24)
PROT/CREAT UR: 0.05 MG/MG CREAT (ref 0–0.17)
RBC # BLD AUTO: 4.32 X10*6/UL (ref 4–5.2)
RBC # UR STRIP.AUTO: NEGATIVE /UL
SP GR UR STRIP.AUTO: 1.01
UROBILINOGEN UR STRIP.AUTO-MCNC: NORMAL MG/DL
WBC # BLD AUTO: 4.5 X10*3/UL (ref 4.4–11.3)

## 2024-03-04 PROCEDURE — 86160 COMPLEMENT ANTIGEN: CPT

## 2024-03-04 PROCEDURE — 80220 DRUG ASY HYDROXYCHLOROQUINE: CPT

## 2024-03-04 PROCEDURE — 80053 COMPREHEN METABOLIC PANEL: CPT

## 2024-03-04 PROCEDURE — 82570 ASSAY OF URINE CREATININE: CPT

## 2024-03-04 PROCEDURE — 86140 C-REACTIVE PROTEIN: CPT

## 2024-03-04 PROCEDURE — 1036F TOBACCO NON-USER: CPT | Performed by: STUDENT IN AN ORGANIZED HEALTH CARE EDUCATION/TRAINING PROGRAM

## 2024-03-04 PROCEDURE — 84156 ASSAY OF PROTEIN URINE: CPT

## 2024-03-04 PROCEDURE — 85025 COMPLETE CBC W/AUTO DIFF WBC: CPT

## 2024-03-04 PROCEDURE — 36415 COLL VENOUS BLD VENIPUNCTURE: CPT

## 2024-03-04 PROCEDURE — 85652 RBC SED RATE AUTOMATED: CPT

## 2024-03-04 PROCEDURE — 3008F BODY MASS INDEX DOCD: CPT | Performed by: STUDENT IN AN ORGANIZED HEALTH CARE EDUCATION/TRAINING PROGRAM

## 2024-03-04 PROCEDURE — 86225 DNA ANTIBODY NATIVE: CPT

## 2024-03-04 PROCEDURE — 81003 URINALYSIS AUTO W/O SCOPE: CPT

## 2024-03-04 PROCEDURE — 99214 OFFICE O/P EST MOD 30 MIN: CPT | Performed by: STUDENT IN AN ORGANIZED HEALTH CARE EDUCATION/TRAINING PROGRAM

## 2024-03-04 RX ORDER — HYDROXYCHLOROQUINE SULFATE 200 MG/1
200 TABLET, FILM COATED ORAL 2 TIMES DAILY
Qty: 180 TABLET | Refills: 3 | Status: SHIPPED | OUTPATIENT
Start: 2024-03-04

## 2024-03-04 NOTE — PROGRESS NOTES
"Subjective   Patient ID: Amanda Beverly is a 43 y.o. female who presents for fu     Since Thursday, patient has been feeling weak and discombobulated. She feels very fatigued and weak, This started Thursday 2/29. No fevers, no chills. Daughter had a cold week prior. No joint pain, no rashes.         Female. Renal biopsy 3/31/16 - membranous glomerulonephritis, consistent with class V lupus nephritis. JT 1:80, Anti SSA + (>8.0). Diagnosed in 2014, noticed protein in urine during pregnancy. At that time, patient was treated on steroids. After she was done giving birth, she was put on hydroxchloroquine. . Has had a right knee surgery for patella dislocation and for \" scraping off cartilage\" back in 1999.  Trialed azathioprine in 2023    Patient had right hip pain flare for which she saw me on 1/24/2024; resolved after a few days with ibuprofen    Current immunosuppression:  Hydroxychloroquine 200 mg BID  Cevimeline BID  azathioprine 50 mg daily     In terms of sxs, she has a lot of fatigue. She has joint pain in her knees, hands. Right knee gives out sometimes. Also has hand stiffness. In the morning, it takes her 15-20 minutes to get up and move. Has alopecia that got worse after diagnosis. She is still actively losing hair. Gets nasal sores. Feels more sluggish in the sun. No malar rash. Has Rayanuds. Sees pain management who put her on topamax for lumbar stenosis.      Has dry eyes and dry mouth. Denies blood clots. has had 2 ectopic pregnancies. Her aunt had lupus.        Past Labs s/f: Positive JT 1:80, SSA>8 rest of NIMISHA neg including dsdna, Sm, RNP, Sm/RNP, SSB, Scl-70, centromere, Sophia-1, chromatin, ribosomal p, normal C3, normal C4, p anca 1:80, MPO neg, PR3 positive to 26, neg PLA2R, neg [id number], DRVVT, cardiolipin, neg Hep B Sag, neg Hep C Ab. TPMT normal     Past Imaging s/f L4-L5 diffuse disc bulge causing moderate spinal canal stenosis and neural foraminal narrowing at L4 an DJD, sacroiliac " narrowing and sclerosis potentially c/f sacroillitis, normal sized kidneys  .             Physical Exam:   Constitutional: Alert and in no acute distress. Well developed, well nourished.        Lab Results   Component Value Date    WBC 6.0 01/24/2024    HGB 13.1 01/24/2024    HCT 40.4 01/24/2024     01/24/2024    ALT 10 01/24/2024    AST 13 01/24/2024    CREATININE 1.02 01/24/2024       XR hips bilateral 5+ VW w pelvis when performed  Narrative: Interpreted By:  Gary Hernandez,   STUDY:  XR HIPS BILATERAL 5+ VW WITH PELVIS WHEN PERFORMED; ;  1/24/2024 4:03  pm      INDICATION:  Signs/Symptoms:weight bearing.      COMPARISON:  None.      ACCESSION NUMBER(S):  [id number]      ORDERING CLINICIAN:  JUDITH WERNER      FINDINGS:  No acute fracture or hip dislocation. Pelvic ring appears intact. No  lytic or blastic changes. No significant joint space narrowing.      Impression: Unremarkable hip radiographs.          MACRO:  None      Signed by: Gary Hernandez 1/25/2024 10:15 PM  Dictation workstation:   [id number]      Assessment/Plan     #Fatigue since 2/29  -I do not think this is a lupus flare; however I will send lupus activity labs  -Recommended patient take COVID test  -I will let patient know the results of her test tomorrow  -Follow-up PMD    #SLE  - Renal biopsy 3/31/16 - membranous glomerulonephritis, consistent with class V lupus nephritis. JT 1:80, Anti SSA + (>8.0). Diagnosed in 2014, noticed protein in urine during pregnancy  -continue  mg daily with yearly retinal screening- 1 year refilled on 3/2024  -Continue azathioprine 50 mg daily, started 2023-1-year refilled October 2023  -with next labs ,will repeat anca panel P anca 1:80 is nonspecific, curious that PR3 was mildly positive  -continue monitoring UA and Pr/Cr  -Has been referred to Derm for SLE alopecia  -Family paperwork filled out February 8, 2024     #Sjogrens Syndrome  -dx by SSA>8, dry eyes, dry mouth  -continue cevimeline- 1  year refill 2/2024  -no B sxs, complements nl  -discussed higher lymphoma risk    #Right hip pain with movement and groin pain (resolved)  -Normal x-rays 2024, with likely tendinitis, resolved after few days of ibuprofen      From ACR 2022 vaccine recommendations:     For Rheumatic and musculoskeletal disease (RMD) patients aged greater than or equal to 65 years, and RMD patients aged >18 and <65 years who are on immunosuppressive medication, giving high-dose or adjuvanted influenza vaccination is conditionally  recommended over giving regular-dose influenza vaccination.     For patients with RMD aged <65 years who are on immunosuppressive medication, pneumococcal vaccination is strongly recommended.     For patients with RMD aged >18 years who are on immunosuppressive medication, administering the recombinant zoster vaccine is strongly recommended.     For patients with RMD aged >26 and <45 years who are on immunosuppressive medication and not previously vaccinated, vaccination against HPV is conditionally recommended.     Cardiovascular Recommendations:     Patients with inflammatory diseases are at high risk for cardiovascular disease, and should be aggressively screened by primary care physicians and started on lipid-lowering medications when appropriate.     Bone Health Recommendations:     Patients on steroids should be on calcium and Vitamin D. Patients with inflammatory rheumatic diseases are higher risk for osteoporosis and should have baseline DEXA screening.      Patient counseled to seek medical care if any new or worsening symptoms, urgently if needed.      Note will  d/w primary care doctor.    RTC as scheduled    Dragon dictation software was used to dictate this note. Errors may have occurred during dictation that was not intended by the user.

## 2024-03-04 NOTE — LETTER
Patient saw me today for a sick visit. Her underlying issue is being worked up. I anticipate that she will be too sick to work from 3/4/2024-/3/6/2024; this time period could change based upon workup and her symptoms    Adri Navarro MD   Rheumatology

## 2024-03-05 LAB
ALBUMIN SERPL BCP-MCNC: 4.2 G/DL (ref 3.4–5)
ALP SERPL-CCNC: 50 U/L (ref 33–110)
ALT SERPL W P-5'-P-CCNC: 18 U/L (ref 7–45)
ANION GAP SERPL CALC-SCNC: 13 MMOL/L (ref 10–20)
AST SERPL W P-5'-P-CCNC: 20 U/L (ref 9–39)
BILIRUB SERPL-MCNC: 0.9 MG/DL (ref 0–1.2)
BUN SERPL-MCNC: 15 MG/DL (ref 6–23)
C3 SERPL-MCNC: 148 MG/DL (ref 87–200)
C4 SERPL-MCNC: 56 MG/DL (ref 10–50)
CALCIUM SERPL-MCNC: 9.6 MG/DL (ref 8.6–10.6)
CHLORIDE SERPL-SCNC: 103 MMOL/L (ref 98–107)
CO2 SERPL-SCNC: 27 MMOL/L (ref 21–32)
CREAT SERPL-MCNC: 1.11 MG/DL (ref 0.5–1.05)
CRP SERPL-MCNC: 0.14 MG/DL
DSDNA AB SER-ACNC: <1 IU/ML
EGFRCR SERPLBLD CKD-EPI 2021: 63 ML/MIN/1.73M*2
GLUCOSE SERPL-MCNC: 99 MG/DL (ref 74–99)
POTASSIUM SERPL-SCNC: 4.7 MMOL/L (ref 3.5–5.3)
PROT SERPL-MCNC: 7.5 G/DL (ref 6.4–8.2)
SODIUM SERPL-SCNC: 138 MMOL/L (ref 136–145)

## 2024-03-11 LAB — SCAN RESULT: NORMAL

## 2024-04-03 NOTE — PROGRESS NOTES
"Subjective   Patient ID: Amanda Beverly is a 43 y.o. female who presents for Urinary Frequency and Annual Exam.    Last Annual Physical: March 2023  Last Dental Visit: No recent dental visit   Last Eye exam: Up-to-date   Hearing Concerns: No   Diet: Well- balanced   Exercise Routine: No exercise routine       HPI   The patient reports that she is taking lisinopril for her hypertension, Zoloft for depression, Topamax for lower back pain and Plaquenil as well as Evoxac for SLE and Sjrojen's syndrome. She is taking these medications as prescribed and is tolerating them well. She has a concern for a UTI and mentions that she noticed hematuria 3 days ago.    Review of Systems  Constitutional: + hot flashes. No fever or chills, No Night Sweats  Eyes: + Blurry Vision. No Eye sight problems  ENT: No Nasal Discharge, Hoarseness, sore throat  Cardiovascular: no chest pain, no palpitations and no syncope.   Respiratory: no cough, no shortness of breath during exertion and no shortness of breath at rest.   Gastrointestinal: no abdominal pain, no nausea and no vomiting.   : No vaginal discharge, burning with urination, no blood in urine or stools  Skin: No Skin rashes or Lesions  Neuro: No Headache, no dizziness or Numbness or tingling  Psych: No Anxiety, depression or sleeping problems  Heme: No Easy bleeding or brusing.     Objective   /70   Pulse 73   Ht 1.702 m (5' 7\")   Wt (!) 159 kg (350 lb)   SpO2 97%   BMI 54.82 kg/m²     Physical Exam  Constitutional: Alert and in no acute distress. Well developed, well nourished.   Head and Face: Head and face: Normal.    Eyes: Normal external exam.   Ears, Nose, Mouth, and Throat: External inspection of ears and nose: Normal.  Hearing: Normal.   Cardiovascular: Heart rate and rhythm were normal, normal S1 and S2. Pedal pulses: Normal. No peripheral edema.   Pulmonary: No respiratory distress. Clear bilateral breath sounds.   Musculoskeletal: No joint swelling seen, normal " movements of all extremities. Range of motion: Normal.  Muscle strength/tone: Normal.    Skin: Normal skin color and pigmentation, normal skin turgor, and no rash.   Psychiatric: Judgment and insight: Intact. Mood and affect: Normal.    Lab Results   Component Value Date    WBC 4.5 03/04/2024    HGB 13.0 03/04/2024    HCT 40.5 03/04/2024     03/04/2024    CHOL 147 04/13/2023    TRIG 69 04/13/2023    HDL 47.5 04/13/2023    ALT 18 03/04/2024    AST 20 03/04/2024     03/04/2024    K 4.7 03/04/2024     03/04/2024    CREATININE 1.11 (H) 03/04/2024    BUN 15 03/04/2024    CO2 27 03/04/2024    TSH 1.38 04/13/2023    HGBA1C 5.4 04/13/2023       XR hips bilateral 5+ VW w pelvis when performed  Narrative: Interpreted By:  Gary Hernandez,   STUDY:  XR HIPS BILATERAL 5+ VW WITH PELVIS WHEN PERFORMED; ;  1/24/2024 4:03  pm      INDICATION:  Signs/Symptoms:weight bearing.      COMPARISON:  None.      ACCESSION NUMBER(S):  [id number]      ORDERING CLINICIAN:  JUDITH WERNER      FINDINGS:  No acute fracture or hip dislocation. Pelvic ring appears intact. No  lytic or blastic changes. No significant joint space narrowing.      Impression: Unremarkable hip radiographs.          MACRO:  None      Signed by: Gary Hernandez 1/25/2024 10:15 PM  Dictation workstation:   [id number]      Assessment/Plan   Diagnoses and all orders for this visit:  Annual physical exam  -     Hemoglobin A1C; Future  -     Lipid Panel; Future  -     TSH with reflex to Free T4 if abnormal; Future  -     Vitamin B12; Future  Morbid obesity with BMI of 50.0-59.9, adult (CMS/[facility])  Frequency of urination  -     Urinalysis with Reflex Microscopic; Future  -     Urine Culture; Future  Sjogren's syndrome with inflammatory arthritis (CMS/[facility])  Systemic lupus erythematosus, unspecified SLE type, unspecified organ involvement status (CMS/[facility])  Major depressive disorder with single episode, in full remission (CMS/[facility])  -     Follow Up In Advanced  Primary Care - PCP - Established; Future  Vitamin D deficiency  -     Vitamin D 25-Hydroxy,Total (for eval of Vitamin D levels); Future  Visit for screening mammogram  -     BI mammo bilateral screening tomosynthesis; Future  Encounter for screening for malignant neoplasm of colon  -     Colonoscopy Screening; Average Risk Patient; Future  Nephrotic syndrome  -     lisinopril 20 mg tablet; Take 1 tablet (20 mg) by mouth once daily.  Sacroiliitis (CMS/HCC)  -     sertraline (Zoloft) 50 mg tablet; Take 1 tablet (50 mg) by mouth once daily.        Dear Amanda Beverly     It was my pleasure to take care of you today in the office. Below are the things we discussed today:    1. 1. Immunizations: Yearly Flu shot is recommended. Up-to-date          a: COVID: Please get booster from the pharmacy          b: Tetanus: Up-to-date. Last done in 2015          c: Shingrix: Please follow up for it or get it from the pharmacy         d: Pneumovax: Up-to-date         e: Prevnar: The patient will follow up for it in 1 week     2. Blood Work: Ordered   3. Seen your dentist twice a year  4. Yearly Eye exam is recommended    5. BMI: Obese   6: Diet recommendations:   Eat Clean, Try to have as many home cooked meals as possible  Avoid processed foods which contain excess calories, sugar, and sodium.    7. Exercise recommendations:   150 minutes a week to maintain your weight     If you have to loose weight, you need a better diet and exercise plan.     8. Supplements recommended:  a - Calcium 600 mg up to twice a day to get a total of 1200 mg. Each 8 oz of milk or yogurt or 1 oz of cheese, 1 Banana, 1 serving of green Leafy vegetable has about 300 mg of Calcium, so you may subtract that amount. Calcium citrate is the only acceptable supplement to take if you take an acid suppressing medication like Prilosec; otherwise Calcium carbonate is acceptable too (It can cause Constipation).   b - Vitamin D - 2000 IU daily     9. Please get your  Living will / Advance directive completed if you do not have one already. Please make sure our office has a copy of the latest one.     10. Colonoscopy: Ordered for June 2024    11. Mammogram: Ordered  12. PAP: Due. The patient will come back for it   13. Skin Check: Please see Dermatology once a year for a Skin Check.     15. Dysuria: Urinalysis and urine culture ordered.    16. SLE and Sjrogen's syndrome: Continue Plaquenil and Evoxac.      17. Hypertension: Well- controlled. Continue Lisinopril.     18. Depression: Continue Zoloft.      19. Lower back pain: Continue Topamax.    Follow up in 6 months.    Follow up in one year for a Physical. Please call the office before your Physical to see if you need blood work completed prior to your physical.     Please call me if any questions arise from now until your next visit. I will call you after I am done seeing patients. A Doctor is always available by phone when the office is closed. Please feel free to call for help with any problem that you feel shouldn't wait until the office re-opens.     Scribe Attestation  By signing my name below, IElizabeth Scribe   attest that this documentation has been prepared under the direction and in the presence of Bee Arellano MD.

## 2024-04-04 ENCOUNTER — OFFICE VISIT (OUTPATIENT)
Dept: PRIMARY CARE | Facility: CLINIC | Age: 44
End: 2024-04-04
Payer: COMMERCIAL

## 2024-04-04 ENCOUNTER — LAB (OUTPATIENT)
Dept: LAB | Facility: LAB | Age: 44
End: 2024-04-04
Payer: COMMERCIAL

## 2024-04-04 VITALS
SYSTOLIC BLOOD PRESSURE: 122 MMHG | WEIGHT: 293 LBS | BODY MASS INDEX: 45.99 KG/M2 | HEIGHT: 67 IN | OXYGEN SATURATION: 97 % | DIASTOLIC BLOOD PRESSURE: 70 MMHG | HEART RATE: 73 BPM

## 2024-04-04 DIAGNOSIS — Z12.11 ENCOUNTER FOR SCREENING FOR MALIGNANT NEOPLASM OF COLON: ICD-10-CM

## 2024-04-04 DIAGNOSIS — M35.05 SJOGREN'S SYNDROME WITH INFLAMMATORY ARTHRITIS (MULTI): ICD-10-CM

## 2024-04-04 DIAGNOSIS — N04.9 NEPHROTIC SYNDROME: ICD-10-CM

## 2024-04-04 DIAGNOSIS — R35.0 FREQUENCY OF URINATION: ICD-10-CM

## 2024-04-04 DIAGNOSIS — M32.9 SYSTEMIC LUPUS ERYTHEMATOSUS, UNSPECIFIED SLE TYPE, UNSPECIFIED ORGAN INVOLVEMENT STATUS (MULTI): ICD-10-CM

## 2024-04-04 DIAGNOSIS — E66.01 MORBID OBESITY WITH BMI OF 50.0-59.9, ADULT (MULTI): ICD-10-CM

## 2024-04-04 DIAGNOSIS — Z00.00 ANNUAL PHYSICAL EXAM: ICD-10-CM

## 2024-04-04 DIAGNOSIS — M46.1 SACROILIITIS (CMS-HCC): ICD-10-CM

## 2024-04-04 DIAGNOSIS — E55.9 VITAMIN D DEFICIENCY: ICD-10-CM

## 2024-04-04 DIAGNOSIS — Z12.31 VISIT FOR SCREENING MAMMOGRAM: ICD-10-CM

## 2024-04-04 DIAGNOSIS — Z00.00 ANNUAL PHYSICAL EXAM: Primary | ICD-10-CM

## 2024-04-04 DIAGNOSIS — F32.5 MAJOR DEPRESSIVE DISORDER WITH SINGLE EPISODE, IN FULL REMISSION (CMS-HCC): ICD-10-CM

## 2024-04-04 LAB
25(OH)D3 SERPL-MCNC: 28 NG/ML (ref 30–100)
APPEARANCE UR: CLEAR
BILIRUB UR STRIP.AUTO-MCNC: NEGATIVE MG/DL
CHOLEST SERPL-MCNC: 152 MG/DL (ref 0–199)
CHOLESTEROL/HDL RATIO: 3.5
COLOR UR: NORMAL
EST. AVERAGE GLUCOSE BLD GHB EST-MCNC: 94 MG/DL
GLUCOSE UR STRIP.AUTO-MCNC: NORMAL MG/DL
HBA1C MFR BLD: 4.9 %
HDLC SERPL-MCNC: 43 MG/DL
KETONES UR STRIP.AUTO-MCNC: NEGATIVE MG/DL
LDLC SERPL CALC-MCNC: 96 MG/DL
LEUKOCYTE ESTERASE UR QL STRIP.AUTO: NEGATIVE
NITRITE UR QL STRIP.AUTO: NEGATIVE
NON HDL CHOLESTEROL: 109 MG/DL (ref 0–149)
PH UR STRIP.AUTO: 5 [PH]
PROT UR STRIP.AUTO-MCNC: NEGATIVE MG/DL
RBC # UR STRIP.AUTO: NEGATIVE /UL
SP GR UR STRIP.AUTO: 1.02
TRIGL SERPL-MCNC: 66 MG/DL (ref 0–149)
TSH SERPL-ACNC: 1.29 MIU/L (ref 0.44–3.98)
UROBILINOGEN UR STRIP.AUTO-MCNC: NORMAL MG/DL
VIT B12 SERPL-MCNC: 833 PG/ML (ref 211–911)
VLDL: 13 MG/DL (ref 0–40)

## 2024-04-04 PROCEDURE — 82306 VITAMIN D 25 HYDROXY: CPT

## 2024-04-04 PROCEDURE — 87186 SC STD MICRODIL/AGAR DIL: CPT

## 2024-04-04 PROCEDURE — 80061 LIPID PANEL: CPT

## 2024-04-04 PROCEDURE — 99396 PREV VISIT EST AGE 40-64: CPT | Performed by: FAMILY MEDICINE

## 2024-04-04 PROCEDURE — 1036F TOBACCO NON-USER: CPT | Performed by: FAMILY MEDICINE

## 2024-04-04 PROCEDURE — 83036 HEMOGLOBIN GLYCOSYLATED A1C: CPT

## 2024-04-04 PROCEDURE — 84443 ASSAY THYROID STIM HORMONE: CPT

## 2024-04-04 PROCEDURE — 3008F BODY MASS INDEX DOCD: CPT | Performed by: FAMILY MEDICINE

## 2024-04-04 PROCEDURE — 81003 URINALYSIS AUTO W/O SCOPE: CPT

## 2024-04-04 PROCEDURE — 87086 URINE CULTURE/COLONY COUNT: CPT

## 2024-04-04 PROCEDURE — 36415 COLL VENOUS BLD VENIPUNCTURE: CPT

## 2024-04-04 PROCEDURE — 82607 VITAMIN B-12: CPT

## 2024-04-04 RX ORDER — LISINOPRIL 20 MG/1
20 TABLET ORAL DAILY
Qty: 90 TABLET | Refills: 1 | Status: SHIPPED | OUTPATIENT
Start: 2024-04-04 | End: 2024-10-01

## 2024-04-04 RX ORDER — SERTRALINE HYDROCHLORIDE 50 MG/1
50 TABLET, FILM COATED ORAL DAILY
Qty: 90 TABLET | Refills: 1 | Status: SHIPPED | OUTPATIENT
Start: 2024-04-04 | End: 2024-10-01

## 2024-04-04 ASSESSMENT — PATIENT HEALTH QUESTIONNAIRE - PHQ9
SUM OF ALL RESPONSES TO PHQ9 QUESTIONS 1 AND 2: 0
2. FEELING DOWN, DEPRESSED OR HOPELESS: NOT AT ALL
1. LITTLE INTEREST OR PLEASURE IN DOING THINGS: NOT AT ALL

## 2024-04-04 ASSESSMENT — COLUMBIA-SUICIDE SEVERITY RATING SCALE - C-SSRS
1. IN THE PAST MONTH, HAVE YOU WISHED YOU WERE DEAD OR WISHED YOU COULD GO TO SLEEP AND NOT WAKE UP?: NO
2. HAVE YOU ACTUALLY HAD ANY THOUGHTS OF KILLING YOURSELF?: NO

## 2024-04-05 ENCOUNTER — TELEPHONE (OUTPATIENT)
Dept: PRIMARY CARE | Facility: CLINIC | Age: 44
End: 2024-04-05
Payer: COMMERCIAL

## 2024-04-05 DIAGNOSIS — R35.0 FREQUENCY OF URINATION: Primary | ICD-10-CM

## 2024-04-05 RX ORDER — CEPHALEXIN 500 MG/1
500 CAPSULE ORAL 2 TIMES DAILY
Qty: 14 CAPSULE | Refills: 0 | Status: SHIPPED | OUTPATIENT
Start: 2024-04-05 | End: 2024-04-12

## 2024-04-06 LAB — BACTERIA UR CULT: ABNORMAL

## 2024-05-13 ENCOUNTER — TELEPHONE (OUTPATIENT)
Dept: PRIMARY CARE | Facility: CLINIC | Age: 44
End: 2024-05-13
Payer: COMMERCIAL

## 2024-06-13 ENCOUNTER — APPOINTMENT (OUTPATIENT)
Dept: RHEUMATOLOGY | Facility: CLINIC | Age: 44
End: 2024-06-13
Payer: COMMERCIAL

## 2024-06-13 DIAGNOSIS — E66.01 MORBID OBESITY WITH BMI OF 50.0-59.9, ADULT (MULTI): ICD-10-CM

## 2024-06-13 DIAGNOSIS — M32.9 SYSTEMIC LUPUS ERYTHEMATOSUS, UNSPECIFIED SLE TYPE, UNSPECIFIED ORGAN INVOLVEMENT STATUS (MULTI): Primary | ICD-10-CM

## 2024-06-13 DIAGNOSIS — M35.05 SJOGREN'S SYNDROME WITH INFLAMMATORY ARTHRITIS (MULTI): ICD-10-CM

## 2024-06-13 PROCEDURE — 3008F BODY MASS INDEX DOCD: CPT | Performed by: STUDENT IN AN ORGANIZED HEALTH CARE EDUCATION/TRAINING PROGRAM

## 2024-06-13 PROCEDURE — 99214 OFFICE O/P EST MOD 30 MIN: CPT | Performed by: STUDENT IN AN ORGANIZED HEALTH CARE EDUCATION/TRAINING PROGRAM

## 2024-06-13 PROCEDURE — 1036F TOBACCO NON-USER: CPT | Performed by: STUDENT IN AN ORGANIZED HEALTH CARE EDUCATION/TRAINING PROGRAM

## 2024-06-13 NOTE — PROGRESS NOTES
"Subjective   Patient ID: Amanda Beverly is a 44 y.o. female who presents for fu     Female. Renal biopsy 3/31/16 - membranous glomerulonephritis, consistent with class V lupus nephritis. JT 1:80, Anti SSA + (>8.0). Diagnosed in 2014, noticed protein in urine during pregnancy. At that time, patient was treated on steroids. After she was done giving birth, she was put on hydroxchloroquine. . Has had a right knee surgery for patella dislocation and for \" scraping off cartilage\" back in 1999.  Trialed azathioprine in 2023    Patient had right hip pain flare for which she saw me on 1/24/2024; resolved after a few days with ibuprofen    Current immunosuppression:  Hydroxychloroquine 200 mg BID  Cevimeline BID  azathioprine 50 mg daily     In terms of sxs, she has a lot of fatigue. She has joint pain in her knees, hands. Right knee gives out sometimes. Also has hand stiffness. In the morning, it takes her 15-20 minutes to get up and move. Has alopecia that got worse after diagnosis. She is still actively losing hair. Gets nasal sores. Feels more sluggish in the sun. No malar rash. Has Rayanuds. Sees pain management who put her on topamax for lumbar stenosis.      Has dry eyes and dry mouth. Denies blood clots. has had 2 ectopic pregnancies. Her aunt had lupus.        Past Labs s/f: Positive JT 1:80, SSA>8 rest of NIMISHA neg including dsdna, Sm, RNP, Sm/RNP, SSB, Scl-70, centromere, Sophia-1, chromatin, ribosomal p, normal C3, normal C4, p anca 1:80, MPO neg, PR3 positive to 26, neg PLA2R, neg [id number], DRVVT, cardiolipin, neg Hep B Sag, neg Hep C Ab. TPMT normal     Past Imaging s/f L4-L5 diffuse disc bulge causing moderate spinal canal stenosis and neural foraminal narrowing at L4 an DJD, sacroiliac narrowing and sclerosis potentially c/f sacroillitis, normal sized kidneys  .             Physical Exam:   Constitutional: Alert and in no acute distress. Well developed, well nourished.  Obese  No synovitis " throughout      Lab Results   Component Value Date    WBC 4.5 03/04/2024    HGB 13.0 03/04/2024    HCT 40.5 03/04/2024     03/04/2024    ALT 18 03/04/2024    AST 20 03/04/2024    CREATININE 1.11 (H) 03/04/2024       XR hips bilateral 5+ VW w pelvis when performed  Narrative: Interpreted By:  Gary Hernandez,   STUDY:  XR HIPS BILATERAL 5+ VW WITH PELVIS WHEN PERFORMED; ;  1/24/2024 4:03  pm      INDICATION:  Signs/Symptoms:weight bearing.      COMPARISON:  None.      ACCESSION NUMBER(S):  [id number]      ORDERING CLINICIAN:  JUDITH WERNER      FINDINGS:  No acute fracture or hip dislocation. Pelvic ring appears intact. No  lytic or blastic changes. No significant joint space narrowing.      Impression: Unremarkable hip radiographs.          MACRO:  None      Signed by: Gary Hernandez 1/25/2024 10:15 PM  Dictation workstation:   [id number]      Assessment/Plan       #SLE  - Renal biopsy 3/31/16 - membranous glomerulonephritis, consistent with class V lupus nephritis. JT 1:80, Anti SSA + (>8.0). Diagnosed in 2014, noticed protein in urine during pregnancy  -continue  mg daily with yearly retinal screening- 1 year refilled on 3/2024  -Continue azathioprine 50 mg daily, started 2023-1-year refilled October 2023\  -continue monitoring UA and Pr/Cr  -Has been referred to Derm for SLE alopecia  -FMLA paperwork filled out February 8, 2024    #+PR3  -Low positive MN-3, ANCA negative, no signs of ANCA associated vasculitis,     #Sjogrens Syndrome  -dx by SSA>8, dry eyes, dry mouth  -continue cevimeline- 1 year refill 2/2024  -no B sxs, complements nl  -discussed higher lymphoma risk\    #Obesity  -Discussed with patient that her joint pains are likely due to obesity, not due to lupus, continue working on weight loss    From ACR 2022 vaccine recommendations:     For Rheumatic and musculoskeletal disease (RMD) patients aged greater than or equal to 65 years, and RMD patients aged >18 and <65 years who are on  immunosuppressive medication, giving high-dose or adjuvanted influenza vaccination is conditionally  recommended over giving regular-dose influenza vaccination.     For patients with RMD aged <65 years who are on immunosuppressive medication, pneumococcal vaccination is strongly recommended.     For patients with RMD aged >18 years who are on immunosuppressive medication, administering the recombinant zoster vaccine is strongly recommended.     For patients with RMD aged >26 and <45 years who are on immunosuppressive medication and not previously vaccinated, vaccination against HPV is conditionally recommended.     Cardiovascular Recommendations:     Patients with inflammatory diseases are at high risk for cardiovascular disease, and should be aggressively screened by primary care physicians and started on lipid-lowering medications when appropriate.     Bone Health Recommendations:     Patients on steroids should be on calcium and Vitamin D. Patients with inflammatory rheumatic diseases are higher risk for osteoporosis and should have baseline DEXA screening.      Patient counseled to seek medical care if any new or worsening symptoms, urgently if needed.      Note discussed with primary care doctor today    Work note written for today, and also for Monday and Tuesday of this week since patient had pain    RTC 3-4 mo  , lab before  Dragon dictation software was used to dictate this note. Errors may have occurred during dictation that was not intended by the user.

## 2024-06-13 NOTE — LETTER
June 13, 2024     Patient: Amanda Beverly   YOB: 1980   Date of Visit: 6/13/2024       To Whom It May Concern:    Amanda Beverly was seen in my clinic on 6/13/2024 at 12:00 pm. Please excuse  for her absence from work on this day to make the appointment. She also had joint pain due to her underlying conditions on Monday 6/11/224 and Tuesday 6/12/2024 so please excuse her from work on those days    If you have any questions or concerns, please don't hesitate to call.         Sincerely,         Adri Navarro MD        CC: No Recipients

## 2024-06-19 ENCOUNTER — TELEPHONE (OUTPATIENT)
Dept: PRIMARY CARE | Facility: CLINIC | Age: 44
End: 2024-06-19
Payer: COMMERCIAL

## 2024-06-27 ENCOUNTER — CLINICAL SUPPORT (OUTPATIENT)
Dept: PRIMARY CARE | Facility: CLINIC | Age: 44
End: 2024-06-27
Payer: COMMERCIAL

## 2024-06-27 DIAGNOSIS — Z23 NEED FOR VACCINATION: ICD-10-CM

## 2024-06-27 PROCEDURE — 90677 PCV20 VACCINE IM: CPT | Performed by: FAMILY MEDICINE

## 2024-06-27 PROCEDURE — 90471 IMMUNIZATION ADMIN: CPT | Performed by: FAMILY MEDICINE

## 2024-07-01 ENCOUNTER — APPOINTMENT (OUTPATIENT)
Dept: PRIMARY CARE | Facility: CLINIC | Age: 44
End: 2024-07-01
Payer: COMMERCIAL

## 2024-09-12 ENCOUNTER — APPOINTMENT (OUTPATIENT)
Dept: RHEUMATOLOGY | Facility: CLINIC | Age: 44
End: 2024-09-12
Payer: COMMERCIAL

## 2024-10-01 DIAGNOSIS — G62.9 NEUROPATHY: ICD-10-CM

## 2024-10-01 DIAGNOSIS — M46.1 SACROILIITIS (CMS-HCC): ICD-10-CM

## 2024-10-02 RX ORDER — TOPIRAMATE 100 MG/1
100 TABLET, FILM COATED ORAL NIGHTLY
Qty: 30 TABLET | Refills: 0 | Status: SHIPPED | OUTPATIENT
Start: 2024-10-02

## 2024-10-03 ENCOUNTER — APPOINTMENT (OUTPATIENT)
Dept: PRIMARY CARE | Facility: CLINIC | Age: 44
End: 2024-10-03
Payer: COMMERCIAL

## 2024-10-15 ENCOUNTER — OFFICE VISIT (OUTPATIENT)
Dept: URGENT CARE | Age: 44
End: 2024-10-15
Payer: COMMERCIAL

## 2024-10-15 VITALS
DIASTOLIC BLOOD PRESSURE: 81 MMHG | TEMPERATURE: 98.6 F | SYSTOLIC BLOOD PRESSURE: 127 MMHG | HEART RATE: 74 BPM | OXYGEN SATURATION: 96 % | RESPIRATION RATE: 16 BRPM

## 2024-10-15 DIAGNOSIS — R10.30 LOWER ABDOMINAL PAIN: ICD-10-CM

## 2024-10-15 DIAGNOSIS — R10.814 LEFT LOWER QUADRANT ABDOMINAL TENDERNESS WITHOUT REBOUND TENDERNESS: Primary | ICD-10-CM

## 2024-10-15 DIAGNOSIS — Z87.42 HISTORY OF OVARIAN CYST: ICD-10-CM

## 2024-10-15 LAB
POC APPEARANCE, URINE: ABNORMAL
POC BILIRUBIN, URINE: NEGATIVE
POC BLOOD, URINE: NEGATIVE
POC COLOR, URINE: YELLOW
POC GLUCOSE, URINE: NEGATIVE MG/DL
POC KETONES, URINE: NEGATIVE MG/DL
POC LEUKOCYTES, URINE: NEGATIVE
POC NITRITE,URINE: NEGATIVE
POC PH, URINE: 5.5 PH
POC PROTEIN, URINE: NEGATIVE MG/DL
POC SPECIFIC GRAVITY, URINE: 1.02
POC UROBILINOGEN, URINE: 0.2 EU/DL
PREGNANCY TEST URINE, POC: NEGATIVE

## 2024-10-15 PROCEDURE — 81003 URINALYSIS AUTO W/O SCOPE: CPT | Performed by: FAMILY MEDICINE

## 2024-10-15 PROCEDURE — 99213 OFFICE O/P EST LOW 20 MIN: CPT | Performed by: FAMILY MEDICINE

## 2024-10-15 PROCEDURE — 1036F TOBACCO NON-USER: CPT | Performed by: FAMILY MEDICINE

## 2024-10-15 PROCEDURE — 81025 URINE PREGNANCY TEST: CPT | Performed by: FAMILY MEDICINE

## 2024-10-15 ASSESSMENT — ENCOUNTER SYMPTOMS: ABDOMINAL PAIN: 1

## 2024-10-15 NOTE — PATIENT INSTRUCTIONS
44-year-old female with known history of large ovarian cyst, presenting with similar type of pain, ongoing for 3 weeks, worsening.  Very tender in the left lower quadrant.  Other than ovarian cyst, other conditions need to be ruled out such as acute diverticulitis.  Urine dipstick here is unremarkable.  Referred to [facility] ED for further evaluation and management.  I spoke with KOLTON Gambino at [facility] ED.  Patient is stable to go by private vehicle.

## 2024-10-15 NOTE — PROGRESS NOTES
Subjective   Patient ID: Amanda Beverly is a 44 y.o. female. They present today with a chief complaint of Abdominal Pain (Pt c/o lower abdominal pain starting about 3 weeks ago but progressively getting worse. Pt states when she sits too long it becomes a throbbing pain; feeling of being bloated. Pt with hx ovarian cysts. MOHAN, RN).    History of Present Illness    Abdominal Pain        43 y/o female with history of SLE, here for left lower quadrant pain x 3 weeks.  It is worsening, now it is 9/10.  She has loss of appetite.  When she gave us a urine sample here, she felt dysuria for the first time.    No nausea, vomiting, diarrhea, constipation.  Unsure if she has fever as she has been having some hot flashes.  No concern for STI.  Last menstrual period was in the end of September, normal cycle.    Between 19 , she had an undefined abdominal pain leading to a laparoscopic procedure where they found that she had a grapefruit sized ovarian cyst.  Soon after that she had 2 ectopic pregnancies necessitating laparoscopic surgeries.  She has 1 child, an 8-year-old daughter.    Past Medical History  Allergies as of 10/15/2024 - Reviewed 10/15/2024   Allergen Reaction Noted    House dust Unknown 02/24/2023    Labetalol Other 02/24/2023    Pollen extracts Unknown 02/24/2023       (Not in a hospital admission)       Past Medical History:   Diagnosis Date    Acute candidiasis of vulva and vagina 08/07/2019    Vaginal candidiasis    Acute vaginitis 03/01/2022    Bacterial vaginosis    Acute vaginitis 07/03/2018    Bacterial vaginosis    Acute vaginitis 08/07/2019    Bacterial vaginosis    Anemia, unspecified 08/10/2015    Low hemoglobin    Benign paroxysmal vertigo, unspecified ear 08/20/2014    Benign paroxysmal positional vertigo    Body mass index (BMI) 50.0-59.9, adult (Multi) 09/03/2021    Body mass index (BMI) of 50.0 to 59.9 in adult    Contact with and (suspected) exposure to covid-19 07/07/2020    Suspected  COVID-19 virus infection    Dietary counseling and surveillance 05/21/2018    Dietary counseling    Dorsalgia, unspecified 04/05/2018    Acute back pain    Dysuria 03/08/2015    Dysuria    Elevated blood-pressure reading, without diagnosis of hypertension 10/07/2017    Elevated blood pressure reading    Encounter for care and examination of lactating mother 12/28/2015    Postpartum care and examination of lactating mother    Encounter for screening for human papillomavirus (HPV)     Screening for HPV (human papillomavirus)    Encounter for screening for lipoid disorders 04/21/2017    Screening for lipid disorders    Encounter for screening for malignant neoplasm of cervix 03/03/2015    Screening for cervical cancer    Exercise counseling 05/21/2018    Exercise counseling    Follicular disorder, unspecified 05/06/2020    Acute folliculitis    Long term (current) use of systemic steroids 01/29/2016    On prednisone therapy    Morbid (severe) obesity due to excess calories (Multi) 12/12/2019    Obesity, morbid, BMI 50 or higher    Morbid (severe) obesity due to excess calories (Multi) 06/20/2019    Morbid obesity with BMI of 40.0-44.9, adult    Morbid (severe) obesity due to excess calories (Multi) 09/03/2021    Obesity, morbid, BMI 50 or higher    Nasal congestion     Head congestion    Other conditions influencing health status     Arthritis    Other conditions influencing health status     History of burning on urination    Other conditions influencing health status 04/05/2015    History of pregnancy    Other long term (current) drug therapy 09/26/2016    Encounter for long-term current use of high risk medication    Other specified diseases and conditions complicating pregnancy ([facility]) 04/28/2016    Systemic lupus complicating pregnancy    Other specified noninflammatory disorders of vagina     Vaginal irritation    Other specified postprocedural states     History of Papanicolaou smear    Other specified soft  tissue disorders 02/08/2015    Right leg swelling    Other symptoms and signs involving the genitourinary system     Sensation of pressure in bladder area    Pain in unspecified ankle and joints of unspecified foot 05/03/2022    Ankle pain    Pain in unspecified foot 08/05/2016    Pains, foot    Pelvic and perineal pain 11/14/2019    Suprapubic pain    Pelvic and perineal pain 06/26/2018    Pelvic pain in female    Pelvic and perineal pain 07/23/2020    Pelvic pressure in female    Personal history of gestational diabetes 12/28/2015    History of gestational diabetes mellitus (GDM)    Personal history of other (healed) physical injury and trauma 09/03/2021    History of strain    Personal history of other diseases of the circulatory system 02/09/2018    History of hypertension    Personal history of other diseases of the digestive system 06/25/2015    History of constipation    Personal history of other diseases of the digestive system 02/18/2016    History of constipation    Personal history of other diseases of the female genital tract 03/19/2021    History of vaginal discharge    Personal history of other diseases of the female genital tract 08/06/2019    History of vaginal discharge    Personal history of other diseases of the female genital tract 08/05/2016    History of vaginal discharge    Personal history of other diseases of the female genital tract 08/05/2021    History of vaginal pruritus    Personal history of other diseases of the musculoskeletal system and connective tissue 02/06/2020    History of low back pain    Personal history of other diseases of the musculoskeletal system and connective tissue 03/26/2016    History of joint pain    Personal history of other diseases of the respiratory system 08/22/2016    History of acute sinusitis    Personal history of other drug therapy 10/05/2017    History of influenza vaccination    Personal history of other drug therapy 09/25/2015    History of influenza  vaccination    Personal history of other endocrine, nutritional and metabolic disease 01/28/2016    History of hyperglycemia    Personal history of other infectious and parasitic diseases 07/27/2020    History of trichomonal vaginitis    Personal history of other specified conditions 01/29/2016    History of edema    Personal history of other specified conditions 06/26/2018    History of incontinence of feces    Personal history of other specified conditions 08/06/2019    History of dysuria    Personal history of other specified conditions 02/18/2016    History of urinary frequency    Personal history of other specified conditions 06/20/2016    History of dysuria    Personal history of other specified conditions 08/17/2020    History of dysuria    Personal history of other specified conditions 01/07/2015    History of paresthesia    Personal history of other specified conditions 08/05/2021    History of abdominal pain    Personal history of other specified conditions 12/23/2021    History of nasal congestion    Personal history of other specified conditions     History of shortness of breath    Personal history of urinary (tract) infections 12/28/2016    History of urinary tract infection    Personal history of urinary (tract) infections 12/29/2016    History of urinary tract infection    Polyphagia 05/11/2021    Polyphagia    Proteinuria, unspecified 08/21/2020    Proteinuria    Radiculopathy, lumbar region 02/10/2020    Acute lumbar radiculopathy    Unspecified urinary incontinence 05/23/2018    Urinary incontinence in female    Urge incontinence 06/26/2018    Urge incontinence of urine    Urinary tract infection, site not specified 08/07/2019    Acute lower UTI    Urinary tract infection, site not specified 08/18/2020    Acute lower UTI       Past Surgical History:   Procedure Laterality Date    ECTOPIC PREGNANCY SURGERY  10/01/2013    Surgical Excision Of Tubal Pregnancy    OTHER SURGICAL HISTORY  10/01/2013     Abdominal Surgery    OTHER SURGICAL HISTORY  05/23/2018    Anal Sphincterectomy    OTHER SURGICAL HISTORY  08/20/2014    Laparoscopic Excision Of Ectopic Pregnancy And Salpingectomy    OTHER SURGICAL HISTORY  08/20/2014    Ovarian Cystectomy        reports that she has never smoked. She has been exposed to tobacco smoke. She has never used smokeless tobacco. She reports that she does not currently use alcohol. She reports that she does not use drugs.    Review of Systems  Review of Systems   Gastrointestinal:  Positive for abdominal pain.                                  Objective    Vitals:    10/15/24 1849   BP: 127/81   BP Location: Left arm   Patient Position: Sitting   BP Cuff Size: Large adult   Pulse: 74   Resp: 16   Temp: 37 °C (98.6 °F)   TempSrc: Oral   SpO2: 96%     Patient's last menstrual period was 09/29/2024 (approximate).    Physical Exam    Constitutional: Vital signs reviewed. Well developed and well nourished. Patient alert and without distress.  In pain with movement.    Eyes: Pupils and irises normal. Pink conjunctivae, anicteric sclerae. No conjunctival injection.    Oropharynx: Moist buccal mucosa.    Neck: No neck mass observed. Supple.    Cardiovascular: No peripheral edema.    Pulmonary: No respiratory distress.     Abdomen: Normal bowel sounds, nondistended, rotund abdomen.  Very tender in the left lower quadrant without rebound tenderness.  Negative CVA tenderness.  Exam is limited by patient's body habitus.    Neurologic: Cortical function: Normal          Procedures    Point of Care Test & Imaging Results from this visit  Results for orders placed or performed in visit on 10/15/24   POCT UA Automated manually resulted   Result Value Ref Range    POC Color, Urine Yellow Straw, Yellow, Light-Yellow    POC Appearance, Urine Hazy (A) Clear    POC Glucose, Urine NEGATIVE NEGATIVE mg/dl    POC Bilirubin, Urine NEGATIVE NEGATIVE    POC Ketones, Urine NEGATIVE NEGATIVE mg/dl    POC Specific  Gravity, Urine 1.020 1.005 - 1.035    POC Blood, Urine NEGATIVE NEGATIVE    POC PH, Urine 5.5 No Reference Range Established PH    POC Protein, Urine NEGATIVE NEGATIVE, 30 (1+) mg/dl    POC Urobilinogen, Urine 0.2 0.2, 1.0 EU/DL    Poc Nitrite, Urine NEGATIVE NEGATIVE    POC Leukocytes, Urine NEGATIVE NEGATIVE   POCT pregnancy, urine manually resulted   Result Value Ref Range    Preg Test, Ur Negative Negative      No results found.    Diagnostic study results (if any) were reviewed by Soraida Antunez.    Assessment/Plan   Allergies, medications, history, and pertinent labs/EKGs/Imaging reviewed by Soraida Antunez.     Medical Decision Making  44-year-old female with known history of large ovarian cyst, presenting with similar type of pain, ongoing for 3 weeks, worsening.  Very tender in the left lower quadrant.  Other than ovarian cyst, other conditions need to be ruled out such as acute diverticulitis.  Urine dipstick here is unremarkable.  Referred to [facility] ED for further evaluation and management.  I spoke with KOLTON Gambino at [facility] ED.  Patient is stable to go by private vehicle.    Orders and Diagnoses  Diagnoses and all orders for this visit:  Left lower quadrant abdominal tenderness without rebound tenderness  Lower abdominal pain  -     POCT UA Automated manually resulted  -     POCT pregnancy, urine manually resulted  History of ovarian cyst      Medical Admin Record      Patient disposition: ED    Electronically signed by Soraida Antunez  7:42 PM

## 2024-10-17 ENCOUNTER — APPOINTMENT (OUTPATIENT)
Dept: RADIOLOGY | Facility: HOSPITAL | Age: 44
End: 2024-10-17
Payer: COMMERCIAL

## 2024-10-17 ENCOUNTER — HOSPITAL ENCOUNTER (EMERGENCY)
Facility: HOSPITAL | Age: 44
Discharge: HOME | End: 2024-10-17
Attending: EMERGENCY MEDICINE
Payer: COMMERCIAL

## 2024-10-17 VITALS
RESPIRATION RATE: 18 BRPM | HEART RATE: 66 BPM | SYSTOLIC BLOOD PRESSURE: 126 MMHG | HEIGHT: 67 IN | WEIGHT: 293 LBS | BODY MASS INDEX: 45.99 KG/M2 | DIASTOLIC BLOOD PRESSURE: 63 MMHG | TEMPERATURE: 98.1 F | OXYGEN SATURATION: 100 %

## 2024-10-17 DIAGNOSIS — K57.90 DIVERTICULOSIS: ICD-10-CM

## 2024-10-17 DIAGNOSIS — R10.32 LLQ ABDOMINAL PAIN: ICD-10-CM

## 2024-10-17 DIAGNOSIS — N88.8 NABOTHIAN FOLLICLES ON CERVIX: Primary | ICD-10-CM

## 2024-10-17 LAB
ALBUMIN SERPL BCP-MCNC: 4.3 G/DL (ref 3.4–5)
ALP SERPL-CCNC: 55 U/L (ref 33–110)
ALT SERPL W P-5'-P-CCNC: 11 U/L (ref 7–45)
ANION GAP SERPL CALC-SCNC: 12 MMOL/L (ref 10–20)
APPEARANCE UR: CLEAR
AST SERPL W P-5'-P-CCNC: 15 U/L (ref 9–39)
BACTERIA #/AREA URNS AUTO: ABNORMAL /HPF
BASOPHILS # BLD AUTO: 0.02 X10*3/UL (ref 0–0.1)
BASOPHILS NFR BLD AUTO: 0.4 %
BILIRUB SERPL-MCNC: 0.7 MG/DL (ref 0–1.2)
BILIRUB UR STRIP.AUTO-MCNC: NEGATIVE MG/DL
BUN SERPL-MCNC: 15 MG/DL (ref 6–23)
CALCIUM SERPL-MCNC: 9.1 MG/DL (ref 8.6–10.3)
CHLORIDE SERPL-SCNC: 104 MMOL/L (ref 98–107)
CO2 SERPL-SCNC: 24 MMOL/L (ref 21–32)
COLOR UR: ABNORMAL
CREAT SERPL-MCNC: 1.07 MG/DL (ref 0.5–1.05)
EGFRCR SERPLBLD CKD-EPI 2021: 65 ML/MIN/1.73M*2
EOSINOPHIL # BLD AUTO: 0.1 X10*3/UL (ref 0–0.7)
EOSINOPHIL NFR BLD AUTO: 2.2 %
ERYTHROCYTE [DISTWIDTH] IN BLOOD BY AUTOMATED COUNT: 11.4 % (ref 11.5–14.5)
GLUCOSE SERPL-MCNC: 105 MG/DL (ref 74–99)
GLUCOSE UR STRIP.AUTO-MCNC: NORMAL MG/DL
HCG UR QL IA.RAPID: NEGATIVE
HCT VFR BLD AUTO: 41.3 % (ref 36–46)
HGB BLD-MCNC: 13.4 G/DL (ref 12–16)
IMM GRANULOCYTES # BLD AUTO: 0.02 X10*3/UL (ref 0–0.7)
IMM GRANULOCYTES NFR BLD AUTO: 0.4 % (ref 0–0.9)
KETONES UR STRIP.AUTO-MCNC: NEGATIVE MG/DL
LEUKOCYTE ESTERASE UR QL STRIP.AUTO: ABNORMAL
LYMPHOCYTES # BLD AUTO: 2.03 X10*3/UL (ref 1.2–4.8)
LYMPHOCYTES NFR BLD AUTO: 44.7 %
MAGNESIUM SERPL-MCNC: 2 MG/DL (ref 1.6–2.4)
MCH RBC QN AUTO: 30 PG (ref 26–34)
MCHC RBC AUTO-ENTMCNC: 32.4 G/DL (ref 32–36)
MCV RBC AUTO: 92 FL (ref 80–100)
MONOCYTES # BLD AUTO: 0.47 X10*3/UL (ref 0.1–1)
MONOCYTES NFR BLD AUTO: 10.4 %
MUCOUS THREADS #/AREA URNS AUTO: ABNORMAL /LPF
NEUTROPHILS # BLD AUTO: 1.9 X10*3/UL (ref 1.2–7.7)
NEUTROPHILS NFR BLD AUTO: 41.9 %
NITRITE UR QL STRIP.AUTO: NEGATIVE
NRBC BLD-RTO: 0 /100 WBCS (ref 0–0)
PH UR STRIP.AUTO: 5 [PH]
PLATELET # BLD AUTO: 238 X10*3/UL (ref 150–450)
POTASSIUM SERPL-SCNC: 4.3 MMOL/L (ref 3.5–5.3)
PROT SERPL-MCNC: 7.9 G/DL (ref 6.4–8.2)
PROT UR STRIP.AUTO-MCNC: NEGATIVE MG/DL
RBC # BLD AUTO: 4.47 X10*6/UL (ref 4–5.2)
RBC # UR STRIP.AUTO: NEGATIVE /UL
RBC #/AREA URNS AUTO: ABNORMAL /HPF
SODIUM SERPL-SCNC: 136 MMOL/L (ref 136–145)
SP GR UR STRIP.AUTO: 1.02
SQUAMOUS #/AREA URNS AUTO: ABNORMAL /HPF
UROBILINOGEN UR STRIP.AUTO-MCNC: NORMAL MG/DL
WBC # BLD AUTO: 4.5 X10*3/UL (ref 4.4–11.3)
WBC #/AREA URNS AUTO: ABNORMAL /HPF

## 2024-10-17 PROCEDURE — 81001 URINALYSIS AUTO W/SCOPE: CPT

## 2024-10-17 PROCEDURE — 76856 US EXAM PELVIC COMPLETE: CPT

## 2024-10-17 PROCEDURE — 85025 COMPLETE CBC W/AUTO DIFF WBC: CPT

## 2024-10-17 PROCEDURE — 74177 CT ABD & PELVIS W/CONTRAST: CPT | Mod: FOREIGN READ | Performed by: RADIOLOGY

## 2024-10-17 PROCEDURE — 36415 COLL VENOUS BLD VENIPUNCTURE: CPT

## 2024-10-17 PROCEDURE — 74177 CT ABD & PELVIS W/CONTRAST: CPT

## 2024-10-17 PROCEDURE — 76856 US EXAM PELVIC COMPLETE: CPT | Mod: FOREIGN READ | Performed by: RADIOLOGY

## 2024-10-17 PROCEDURE — 96374 THER/PROPH/DIAG INJ IV PUSH: CPT | Mod: 59

## 2024-10-17 PROCEDURE — 99285 EMERGENCY DEPT VISIT HI MDM: CPT | Mod: 25

## 2024-10-17 PROCEDURE — 80053 COMPREHEN METABOLIC PANEL: CPT

## 2024-10-17 PROCEDURE — 83735 ASSAY OF MAGNESIUM: CPT

## 2024-10-17 PROCEDURE — 2500000004 HC RX 250 GENERAL PHARMACY W/ HCPCS (ALT 636 FOR OP/ED)

## 2024-10-17 PROCEDURE — 2550000001 HC RX 255 CONTRASTS: Performed by: EMERGENCY MEDICINE

## 2024-10-17 PROCEDURE — 81025 URINE PREGNANCY TEST: CPT

## 2024-10-17 PROCEDURE — 76830 TRANSVAGINAL US NON-OB: CPT | Mod: FOREIGN READ | Performed by: RADIOLOGY

## 2024-10-17 RX ORDER — ACETAMINOPHEN 500 MG
1000 TABLET ORAL EVERY 6 HOURS PRN
Qty: 30 TABLET | Refills: 0 | Status: SHIPPED | OUTPATIENT
Start: 2024-10-17 | End: 2024-10-27

## 2024-10-17 RX ORDER — KETOROLAC TROMETHAMINE 30 MG/ML
15 INJECTION, SOLUTION INTRAMUSCULAR; INTRAVENOUS ONCE
Status: COMPLETED | OUTPATIENT
Start: 2024-10-17 | End: 2024-10-17

## 2024-10-17 RX ORDER — KETOROLAC TROMETHAMINE 10 MG/1
10 TABLET, FILM COATED ORAL EVERY 6 HOURS PRN
Qty: 20 TABLET | Refills: 0 | Status: SHIPPED | OUTPATIENT
Start: 2024-10-17 | End: 2024-10-22

## 2024-10-17 ASSESSMENT — PAIN DESCRIPTION - LOCATION
LOCATION: PELVIS
LOCATION: ABDOMEN

## 2024-10-17 ASSESSMENT — PAIN SCALES - GENERAL
PAINLEVEL_OUTOF10: 5 - MODERATE PAIN

## 2024-10-17 ASSESSMENT — PAIN - FUNCTIONAL ASSESSMENT
PAIN_FUNCTIONAL_ASSESSMENT: 0-10
PAIN_FUNCTIONAL_ASSESSMENT: 0-10

## 2024-10-17 ASSESSMENT — PAIN DESCRIPTION - ORIENTATION
ORIENTATION: LEFT
ORIENTATION: LEFT;LOWER

## 2024-10-17 ASSESSMENT — PAIN DESCRIPTION - PAIN TYPE: TYPE: ACUTE PAIN

## 2024-10-17 ASSESSMENT — PAIN DESCRIPTION - ONSET: ONSET: GRADUAL

## 2024-10-17 ASSESSMENT — PAIN DESCRIPTION - FREQUENCY: FREQUENCY: CONSTANT/CONTINUOUS

## 2024-10-17 ASSESSMENT — COLUMBIA-SUICIDE SEVERITY RATING SCALE - C-SSRS
6. HAVE YOU EVER DONE ANYTHING, STARTED TO DO ANYTHING, OR PREPARED TO DO ANYTHING TO END YOUR LIFE?: NO
1. IN THE PAST MONTH, HAVE YOU WISHED YOU WERE DEAD OR WISHED YOU COULD GO TO SLEEP AND NOT WAKE UP?: NO
2. HAVE YOU ACTUALLY HAD ANY THOUGHTS OF KILLING YOURSELF?: NO

## 2024-10-17 ASSESSMENT — ACTIVITIES OF DAILY LIVING (ADL): EFFECT OF PAIN ON DAILY ACTIVITIES: DECREASED

## 2024-10-17 ASSESSMENT — PAIN DESCRIPTION - DESCRIPTORS: DESCRIPTORS: CRAMPING;SHARP

## 2024-10-17 ASSESSMENT — PAIN DESCRIPTION - PROGRESSION: CLINICAL_PROGRESSION: GRADUALLY WORSENING

## 2024-10-17 NOTE — DISCHARGE INSTRUCTIONS
You have been evaluated in the Emergency Department today for left lower quadrant pain . Your evaluation, including CT and US, suggests that your symptoms potentially are due to nabothian follicles. You will be referred to OB    Please follow up with your primary care physician within two days. If you do not have a primary care doctor you may call [phone] to make an appointment.    Return to the Emergency Department if you experience worsening pain, fevers, chills. Return to the Emergency Department if you develop any new or worsening symptoms.   Thank you for choosing us for your care.

## 2024-10-17 NOTE — ED TRIAGE NOTES
Pt presents with the c/o LLQ abd pain x4 weeks. Pt states that she went to urgent care and they were unable to do any imaging. Pt denies any bowel/bladder dysfunction.

## 2024-10-17 NOTE — ED PROVIDER NOTES
Emergency Department Provider Note        History of Present Illness     History provided by: Patient  Limitations to History: None  External Records Reviewed with Brief Summary:  Ultrasounds dated 2015, 2019 without ovarian cyst.  Multiple cervical cyst noted    HPI:  Amanda Beverly is a 44 y.o. female past medical history of Sjogren's, SLE, lumbar stenosis, 2 prior ectopic pregnancies and a ruptured fallopian tube presenting with a chief complaint of left lower quadrant pain.  Patient states that she has had this for about a month.  She was seen in urgent care but stated that they could not do anything for her, they told her she did not have a UTI but she took her daughter's amoxicillin last night which she believes helped her symptoms.  Patient states that the symptoms are similar to when she previously had an ovarian cyst.  She states that the pain is aggravated by sitting for a long time or moving her leg.  She denies nausea, vomiting, constipation, diarrhea, fevers or chills.  She states that she recently got off her menstrual cycle and has no concerns for sexually transmitted infections.  Patient denies any worsening back pain    Physical Exam   Triage vitals:  T 36.6 °C (97.9 °F)  HR 76  /86  RR 20  O2 100 % None (Room air)    General: Awake, alert, in no acute distress  Eyes: Gaze conjugate.  No scleral icterus or injection  HENT: Normo-cephalic, atraumatic. No stridor  CV: Regular rate, regular rhythm. Radial pulses 2+ bilaterally  Resp: Breathing non-labored, speaking in full sentences.  Clear to auscultation bilaterally  GI: Soft, non-distended, focally tender to the left lower quadrant without any radiating symptoms. no rebound or guarding.  MSK/Extremities: No gross bony deformities. Moving all extremities  Skin: Warm. Appropriate color  Neuro: Alert. Oriented. Face symmetric. Speech is fluent.  Gross strength and sensation intact in b/l UE and LEs  Psych: Appropriate mood and  affect    Medical Decision Making & ED Course   Medical Decision Makin y.o. female with history of Sjogren's, lupus, lumbar stenosis, ovarian pathology presenting for a month of left lower quadrant pain that is been intermittent.  On arrival she is hemodynamically stable, afebrile, saturating well on room air no acute distress.  Patient states today her pain is actually improved and she is just mild left lower quadrant abdominal tenderness without rebounding or guarding.  IV inserted, basic labs obtained to look for electrolyte abnormality, kidney injury, anemia, leukocytosis along with a urinalysis to look for possible UTI, blood in the urine.  Given she does not have any other gastrointestinal symptoms, my suspicion for diverticulosis, diverticulitis, obstruction are low so will obtain a pelvis ultrasound.  Ultrasound of the pelvis showed a left adnexal follicle along with notable cystic structures at the level of the cervix.  There is normal blood flow to the left ovary.  I discussed findings with patient in regards to following up with an OB or CT scan to look for possible GI pathology.  Patient states that she cannot stand the pain and would like a CT scan which showed overall no acute findings.  There was colonic diverticulosis without diverticulitis and again noted the left adnexal cyst versus follicle.  I discussed the results with the patient at bedside and recommended obstetrics follow-up.  A referral was given along with a prescription for Toradol she is advised to follow-up with her primary care provider in the next few days.  Patient is in agreement with this plan and discharged in stable condition  ----      Differential diagnoses considered include but are not limited to: Ovarian cyst, torsion, diverticulitis, constipation, nephrolithiasis, UTI, musculoskeletal abdominal pain     Social Determinants of Health which Significantly Impact Care: None identified     EKG Independent Interpretation:  EKG interpreted by myself. Please see ED Course for full interpretation.    Independent Result Review and Interpretation: Relevant laboratory and radiographic results were reviewed and independently interpreted by myself.  As necessary, they are commented on in the ED Course.    Chronic conditions affecting the patient's care: As documented above in [facility]    The patient was discussed with the following consultants/services: None    Care Considerations: As documented above in [facility]    ED Course:  ED Course as of 10/17/24 1801   u Oct 17, 2024   1238 CBC and Auto Differential(!)  No leukocytosis, no anemia [AW]   1251 Urinalysis with Reflex Microscopic(!)  No blood, small amount of leukocyte esterase with positive bacteria but also with sparse epithelial cells [AW]   1306 Comprehensive metabolic panel(!)  Stable baseline creatinine, no electrolyte abnormalities, normal liver functions [AW]   1624 CT abdomen pelvis w IV contrast  IMPRESSION:  1. No acute process.  2. Mild hepatic steatosis.  3. Colonic diverticulosis without findings of diverticulitis.  4. Left adnexal cyst or follicle measuring 1.8 cm.   [AW]      ED Course User Index  [AW] Allyssa Miller DO         Diagnoses as of 10/17/24 1801   Nabothian follicles on cervix   LLQ abdominal pain   Diverticulosis     Disposition   As a result of the work-up, the patient was discharged home.  she was informed of her diagnosis and instructed to come back with any concerns or worsening of condition.  she and was agreeable to the plan as discussed above.  she was given the opportunity to ask questions.  All of the patient's questions were answered.    Procedures   Procedures    Patient seen and discussed with ED attending physician.    Allyssa Miller DO  Emergency Medicine       Allyssa Miller DO  Resident  10/17/24 1804

## 2024-10-17 NOTE — ED NOTES
Pt discharged home w/ self via POV. No significant detrimental changes prior to discharge. Neuro/skin/respiratory grossly WDL. Belongings with pt.     Chuck Garcia RN  10/17/24 [id number]

## 2024-10-21 ENCOUNTER — APPOINTMENT (OUTPATIENT)
Dept: OBSTETRICS AND GYNECOLOGY | Facility: CLINIC | Age: 44
End: 2024-10-21
Payer: COMMERCIAL

## 2024-10-21 VITALS — HEIGHT: 67 IN | WEIGHT: 293 LBS | BODY MASS INDEX: 45.99 KG/M2

## 2024-10-21 DIAGNOSIS — R10.2 PELVIC PAIN IN FEMALE: Primary | ICD-10-CM

## 2024-10-21 PROCEDURE — 1036F TOBACCO NON-USER: CPT | Performed by: OBSTETRICS & GYNECOLOGY

## 2024-10-21 PROCEDURE — 99213 OFFICE O/P EST LOW 20 MIN: CPT | Performed by: OBSTETRICS & GYNECOLOGY

## 2024-10-21 PROCEDURE — 3008F BODY MASS INDEX DOCD: CPT | Performed by: OBSTETRICS & GYNECOLOGY

## 2024-10-21 ASSESSMENT — ENCOUNTER SYMPTOMS
COLOR CHANGE: 0
CHILLS: 0
CONSTIPATION: 0
VOMITING: 0
ABDOMINAL PAIN: 0
UNEXPECTED WEIGHT CHANGE: 0
SHORTNESS OF BREATH: 0
HEMATURIA: 0
APPETITE CHANGE: 0
DYSURIA: 0
FEVER: 0
BLOOD IN STOOL: 0
BACK PAIN: 0
NAUSEA: 0
FREQUENCY: 0
ABDOMINAL DISTENTION: 0
FLANK PAIN: 0
SLEEP DISTURBANCE: 0
FATIGUE: 0
DIARRHEA: 0

## 2024-10-21 ASSESSMENT — PAIN SCALES - GENERAL: PAINLEVEL_OUTOF10: 9

## 2024-10-21 NOTE — PROGRESS NOTES
Amanda Beverly is a 44 y.o.  here for abdominal and pelvic pain    Menstrual cycles: regular menstrual cycles, has cramping/pain every other cycle  Sexually active: not currently     Pt reports onset of pain a few weeks ago. Pain is in left lower quadrant.  Worse with activities like walking.  Pt was seen in ED and told she had left ovarian cyst and cervical nabothian cysts.  She was given pain medication which is not helping much.  Pt states on Saturday she was doing some shopping and had intensified LLQ pain which caused her to stop for a moment.  The pain sometimes radiates down her leg.   Denies vaginal discharge. Denies irregular bleeding. Denies urinary symptoms.   Does have some issues with BM , intermittent constipation.     Past medical and surgical history reviewed.     TATV US: 10/17/24  UTERUS:     The uterus is retroverted in position and measures 10.2 x 5.5 x 5.3  cm.  The uterus demonstrates a normal, homogeneous echotexture.  There  are no discrete fibroids present.  Multiple cystic structures  demonstrated the level of the cervix measuring up to 1.4 x 1.4 x 1.3  cm suggesting nabothian cysts.  This was seen on prior study.  ENDOMETRIUM:   The endometrium measures 1.4 cm.    RIGHT OVARY:  The right ovary is not visualized, likely due to overlying bowel gas.  LEFT OVARY:  The left ovary measures 3.4 x 3.1 x 2.3 cm.  The left ovary  demonstrates a complex follicle measuring 1.7 x 1.5 x 1.3 cm.   Spectral Doppler evaluation of the ovary was performed.  Normal color  and spectral Doppler flow is visualized.     OTHER:  A small amount of fluid is present within the cul-de-sac.  IMPRESSION:  Multiple cystic structures at the level cervix suggesting nabothian  cysts as seen on prior ultrasound.  Normal color and spectral Doppler flow within the left ovary.  Left  ovary complex follicle measuring 1.7 cm.  Nonvisualization of the right ovary.    Obstetric History  OB History    Para Term   AB Living   4 1     3 1   SAB IAB Ectopic Multiple Live Births   1   2          # Outcome Date GA Lbr Mike/2nd Weight Sex Type Anes PTL Lv   4 SAB            3 Ectopic            2 Ectopic            1 Para                 Past Medical History  She has a past medical history of Acute candidiasis of vulva and vagina (08/07/2019), Acute vaginitis (03/01/2022), Acute vaginitis (07/03/2018), Acute vaginitis (08/07/2019), Anemia, unspecified (08/10/2015), Benign paroxysmal vertigo, unspecified ear (08/20/2014), Body mass index (BMI) 50.0-59.9, adult (Multi) (09/03/2021), Contact with and (suspected) exposure to covid-19 (07/07/2020), Dietary counseling and surveillance (05/21/2018), Dorsalgia, unspecified (04/05/2018), Dysuria (03/08/2015), Elevated blood-pressure reading, without diagnosis of hypertension (10/07/2017), Encounter for care and examination of lactating mother (12/28/2015), Encounter for screening for human papillomavirus (HPV), Encounter for screening for lipoid disorders (04/21/2017), Encounter for screening for malignant neoplasm of cervix (03/03/2015), Exercise counseling (05/21/2018), Follicular disorder, unspecified (05/06/2020), Long term (current) use of systemic steroids (01/29/2016), Morbid (severe) obesity due to excess calories (Multi) (12/12/2019), Morbid (severe) obesity due to excess calories (Multi) (06/20/2019), Morbid (severe) obesity due to excess calories (Multi) (09/03/2021), Nasal congestion, Other conditions influencing health status, Other conditions influencing health status, Other conditions influencing health status (04/05/2015), Other long term (current) drug therapy (09/26/2016), Other specified diseases and conditions complicating pregnancy ([facility]) (04/28/2016), Other specified noninflammatory disorders of vagina, Other specified postprocedural states, Other specified soft tissue disorders (02/08/2015), Other symptoms and signs involving the genitourinary system, Pain in  unspecified ankle and joints of unspecified foot (05/03/2022), Pain in unspecified foot (08/05/2016), Pelvic and perineal pain (11/14/2019), Pelvic and perineal pain (06/26/2018), Pelvic and perineal pain (07/23/2020), Personal history of gestational diabetes (12/28/2015), Personal history of other (healed) physical injury and trauma (09/03/2021), Personal history of other diseases of the circulatory system (02/09/2018), Personal history of other diseases of the digestive system (06/25/2015), Personal history of other diseases of the digestive system (02/18/2016), Personal history of other diseases of the female genital tract (03/19/2021), Personal history of other diseases of the female genital tract (08/06/2019), Personal history of other diseases of the female genital tract (08/05/2016), Personal history of other diseases of the female genital tract (08/05/2021), Personal history of other diseases of the musculoskeletal system and connective tissue (02/06/2020), Personal history of other diseases of the musculoskeletal system and connective tissue (03/26/2016), Personal history of other diseases of the respiratory system (08/22/2016), Personal history of other drug therapy (10/05/2017), Personal history of other drug therapy (09/25/2015), Personal history of other endocrine, nutritional and metabolic disease (01/28/2016), Personal history of other infectious and parasitic diseases (07/27/2020), Personal history of other specified conditions (01/29/2016), Personal history of other specified conditions (06/26/2018), Personal history of other specified conditions (08/06/2019), Personal history of other specified conditions (02/18/2016), Personal history of other specified conditions (06/20/2016), Personal history of other specified conditions (08/17/2020), Personal history of other specified conditions (01/07/2015), Personal history of other specified conditions (08/05/2021), Personal history of other specified  "conditions (12/23/2021), Personal history of other specified conditions, Personal history of urinary (tract) infections (12/28/2016), Personal history of urinary (tract) infections (12/29/2016), Polyphagia (05/11/2021), Proteinuria, unspecified (08/21/2020), Radiculopathy, lumbar region (02/10/2020), Unspecified urinary incontinence (05/23/2018), Urge incontinence (06/26/2018), Urinary tract infection, site not specified (08/07/2019), and Urinary tract infection, site not specified (08/18/2020).    Surgical History  She has a past surgical history that includes Other surgical history (10/01/2013); Ectopic pregnancy surgery (10/01/2013); Other surgical history (05/23/2018); Other surgical history (08/20/2014); and Other surgical history (08/20/2014).     Social History  She reports that she has never smoked. She has been exposed to tobacco smoke. She has never used smokeless tobacco. She reports that she does not currently use alcohol. She reports that she does not use drugs.    Family History  Family History   Problem Relation Name Age of Onset    Diabetes Mother      Other (Breast lump) Mother          benign    Hypotension Father      Diabetes Sister      Hypertension Brother          borderline         Ht 1.702 m (5' 7\")   Wt (!) 162 kg (357 lb)   LMP 09/29/2024 (Approximate)   BMI 55.91 kg/m²   Patient's last menstrual period was 09/29/2024 (approximate).    Review of Systems   Constitutional:  Negative for appetite change, chills, fatigue, fever and unexpected weight change.   Respiratory:  Negative for shortness of breath.    Cardiovascular:  Negative for chest pain.   Gastrointestinal:  Negative for abdominal distention, abdominal pain, blood in stool, constipation, diarrhea, nausea and vomiting.   Endocrine: Negative for cold intolerance and heat intolerance.   Genitourinary:  Positive for pelvic pain. Negative for dyspareunia, dysuria, flank pain, frequency, genital sores, hematuria, menstrual problem, " urgency, vaginal bleeding, vaginal discharge and vaginal pain.   Musculoskeletal:  Negative for back pain.   Skin:  Negative for color change.   Psychiatric/Behavioral:  Negative for sleep disturbance.        Physical Exam  Constitutional:       Appearance: Normal appearance.   Abdominal:      Palpations: Abdomen is soft.      Tenderness: There is abdominal tenderness in the suprapubic area and left lower quadrant.      Comments: No rebound/guarding   Genitourinary:     General: Normal vulva.      Vagina: Normal.      Cervix: Normal.      Uterus: Normal.       Adnexa: Right adnexa normal.        Left: Tenderness present.    Skin:     General: Skin is warm and dry.   Neurological:      Mental Status: She is alert.   Psychiatric:         Mood and Affect: Mood normal.         Behavior: Behavior normal.           Assessment and Plan:    LLQ pain  US and CT abd/pelvis did show 1-2 cm left ovarian complex cyst with normal amount of fluid in pelvis.  Discussed possible pain from cyst and/or cyst rupture but seems unlikely d/t cyst size.  CBC and CMP and urinalysis normal at time of ED visit.  Recommend anti-inflammatory medications (ibuprofen and/or tylenol) and repeat pelvic US to assess for resolution of cyst.   Recommend follow up with PCP And consideration for MSK and GI sources of pain.    Nabothian cyst  Normal and likely incidental finding

## 2024-10-25 DIAGNOSIS — M32.9 SYSTEMIC LUPUS ERYTHEMATOSUS, UNSPECIFIED SLE TYPE, UNSPECIFIED ORGAN INVOLVEMENT STATUS (MULTI): ICD-10-CM

## 2024-10-25 DIAGNOSIS — M35.05 SJOGREN'S SYNDROME WITH INFLAMMATORY ARTHRITIS: ICD-10-CM

## 2024-10-25 NOTE — TELEPHONE ENCOUNTER
Prescription Request for Azathioprine      Has The Patient Been Identified By Name And Date Of Birth: Yes     RX Requestor: Pharmacy     Date of Last Refill: 10/18/23    Date Of Last Office Visit: 6/13/24 Dr. Navarro    Date Of Future Office Visit: None Scheduled

## 2024-10-26 RX ORDER — AZATHIOPRINE 50 MG/1
50 TABLET ORAL DAILY
Qty: 30 TABLET | Refills: 0 | Status: SHIPPED | OUTPATIENT
Start: 2024-10-26

## 2024-10-28 ENCOUNTER — OFFICE VISIT (OUTPATIENT)
Dept: RHEUMATOLOGY | Facility: CLINIC | Age: 44
End: 2024-10-28
Payer: COMMERCIAL

## 2024-10-28 VITALS — HEART RATE: 73 BPM | DIASTOLIC BLOOD PRESSURE: 89 MMHG | SYSTOLIC BLOOD PRESSURE: 143 MMHG

## 2024-10-28 DIAGNOSIS — M35.00 SJOGREN'S SYNDROME, WITH UNSPECIFIED ORGAN INVOLVEMENT (MULTI): ICD-10-CM

## 2024-10-28 DIAGNOSIS — M32.9 SYSTEMIC LUPUS ERYTHEMATOSUS, UNSPECIFIED SLE TYPE, UNSPECIFIED ORGAN INVOLVEMENT STATUS (MULTI): ICD-10-CM

## 2024-10-28 DIAGNOSIS — M48.061 NEURAL FORAMINAL STENOSIS OF LUMBAR SPINE: Primary | ICD-10-CM

## 2024-10-28 DIAGNOSIS — M35.05 SJOGREN'S SYNDROME WITH INFLAMMATORY ARTHRITIS: ICD-10-CM

## 2024-10-28 DIAGNOSIS — M87.00 AVASCULAR NECROSIS (MULTI): ICD-10-CM

## 2024-10-28 PROCEDURE — 1036F TOBACCO NON-USER: CPT | Performed by: STUDENT IN AN ORGANIZED HEALTH CARE EDUCATION/TRAINING PROGRAM

## 2024-10-28 PROCEDURE — 99215 OFFICE O/P EST HI 40 MIN: CPT | Performed by: STUDENT IN AN ORGANIZED HEALTH CARE EDUCATION/TRAINING PROGRAM

## 2024-10-28 RX ORDER — CEVIMELINE HYDROCHLORIDE 30 MG/1
30 CAPSULE ORAL 2 TIMES DAILY
Qty: 180 CAPSULE | Refills: 3 | Status: SHIPPED | OUTPATIENT
Start: 2024-10-28

## 2024-10-28 RX ORDER — AZATHIOPRINE 50 MG/1
50 TABLET ORAL DAILY
Qty: 90 TABLET | Refills: 3 | Status: SHIPPED | OUTPATIENT
Start: 2024-10-28

## 2024-10-28 RX ORDER — HYDROXYCHLOROQUINE SULFATE 200 MG/1
200 TABLET, FILM COATED ORAL 2 TIMES DAILY
Qty: 180 TABLET | Refills: 3 | Status: SHIPPED | OUTPATIENT
Start: 2024-10-28

## 2024-10-28 RX ORDER — CELECOXIB 200 MG/1
200 CAPSULE ORAL 2 TIMES DAILY
Qty: 60 CAPSULE | Refills: 0 | Status: SHIPPED | OUTPATIENT
Start: 2024-10-28 | End: 2024-11-27

## 2024-10-29 ENCOUNTER — TELEPHONE (OUTPATIENT)
Dept: RHEUMATOLOGY | Facility: CLINIC | Age: 44
End: 2024-10-29
Payer: COMMERCIAL

## 2024-11-08 ENCOUNTER — APPOINTMENT (OUTPATIENT)
Dept: RADIOLOGY | Facility: HOSPITAL | Age: 44
End: 2024-11-08
Payer: COMMERCIAL

## 2024-11-08 ENCOUNTER — HOSPITAL ENCOUNTER (OUTPATIENT)
Dept: RADIOLOGY | Facility: CLINIC | Age: 44
Discharge: HOME | End: 2024-11-08
Payer: COMMERCIAL

## 2024-11-08 ENCOUNTER — APPOINTMENT (OUTPATIENT)
Dept: RADIOLOGY | Facility: CLINIC | Age: 44
End: 2024-11-08
Payer: COMMERCIAL

## 2024-11-08 DIAGNOSIS — M48.061 NEURAL FORAMINAL STENOSIS OF LUMBAR SPINE: ICD-10-CM

## 2024-11-08 DIAGNOSIS — M87.00 AVASCULAR NECROSIS (MULTI): ICD-10-CM

## 2024-11-08 DIAGNOSIS — M32.9 SYSTEMIC LUPUS ERYTHEMATOSUS, UNSPECIFIED SLE TYPE, UNSPECIFIED ORGAN INVOLVEMENT STATUS (MULTI): ICD-10-CM

## 2024-11-08 DIAGNOSIS — M35.05 SJOGREN'S SYNDROME WITH INFLAMMATORY ARTHRITIS: ICD-10-CM

## 2024-11-08 DIAGNOSIS — M35.00 SJOGREN'S SYNDROME, WITH UNSPECIFIED ORGAN INVOLVEMENT (MULTI): ICD-10-CM

## 2024-11-09 ENCOUNTER — HOSPITAL ENCOUNTER (OUTPATIENT)
Dept: RADIOLOGY | Facility: HOSPITAL | Age: 44
Discharge: HOME | End: 2024-11-09
Payer: COMMERCIAL

## 2024-11-09 DIAGNOSIS — M87.00 AVASCULAR NECROSIS (MULTI): ICD-10-CM

## 2024-11-09 DIAGNOSIS — M32.9 SYSTEMIC LUPUS ERYTHEMATOSUS, UNSPECIFIED SLE TYPE, UNSPECIFIED ORGAN INVOLVEMENT STATUS (MULTI): ICD-10-CM

## 2024-11-09 DIAGNOSIS — M48.061 NEURAL FORAMINAL STENOSIS OF LUMBAR SPINE: ICD-10-CM

## 2024-11-09 DIAGNOSIS — M35.00 SJOGREN'S SYNDROME, WITH UNSPECIFIED ORGAN INVOLVEMENT (MULTI): ICD-10-CM

## 2024-11-09 DIAGNOSIS — M35.05 SJOGREN'S SYNDROME WITH INFLAMMATORY ARTHRITIS: ICD-10-CM

## 2024-11-09 PROCEDURE — 72148 MRI LUMBAR SPINE W/O DYE: CPT | Performed by: RADIOLOGY

## 2024-11-09 PROCEDURE — 73721 MRI JNT OF LWR EXTRE W/O DYE: CPT | Mod: LT

## 2024-11-09 PROCEDURE — 72148 MRI LUMBAR SPINE W/O DYE: CPT

## 2024-11-10 ENCOUNTER — HOSPITAL ENCOUNTER (EMERGENCY)
Facility: HOSPITAL | Age: 44
Discharge: HOME | End: 2024-11-10
Attending: EMERGENCY MEDICINE
Payer: COMMERCIAL

## 2024-11-10 ENCOUNTER — TELEPHONE (OUTPATIENT)
Dept: RHEUMATOLOGY | Facility: CLINIC | Age: 44
End: 2024-11-10
Payer: COMMERCIAL

## 2024-11-10 ENCOUNTER — APPOINTMENT (OUTPATIENT)
Dept: RADIOLOGY | Facility: HOSPITAL | Age: 44
End: 2024-11-10
Payer: COMMERCIAL

## 2024-11-10 VITALS
WEIGHT: 293 LBS | HEIGHT: 67 IN | DIASTOLIC BLOOD PRESSURE: 88 MMHG | TEMPERATURE: 98.1 F | RESPIRATION RATE: 18 BRPM | SYSTOLIC BLOOD PRESSURE: 151 MMHG | BODY MASS INDEX: 45.99 KG/M2 | HEART RATE: 92 BPM | OXYGEN SATURATION: 98 %

## 2024-11-10 DIAGNOSIS — M54.16 LUMBAR RADICULOPATHY: Primary | ICD-10-CM

## 2024-11-10 DIAGNOSIS — M48.062 SPINAL STENOSIS OF LUMBAR REGION WITH NEUROGENIC CLAUDICATION: Primary | ICD-10-CM

## 2024-11-10 DIAGNOSIS — S73.192A TEAR OF LEFT ACETABULAR LABRUM, INITIAL ENCOUNTER: ICD-10-CM

## 2024-11-10 DIAGNOSIS — M46.1 SACROILIITIS (CMS-HCC): ICD-10-CM

## 2024-11-10 DIAGNOSIS — M25.552 LEFT HIP PAIN: ICD-10-CM

## 2024-11-10 LAB
ALBUMIN SERPL BCP-MCNC: 4.1 G/DL (ref 3.4–5)
ALP SERPL-CCNC: 48 U/L (ref 33–110)
ALT SERPL W P-5'-P-CCNC: 14 U/L (ref 7–45)
ANION GAP SERPL CALC-SCNC: 12 MMOL/L (ref 10–20)
APPEARANCE UR: CLEAR
AST SERPL W P-5'-P-CCNC: 29 U/L (ref 9–39)
BASOPHILS # BLD AUTO: 0.01 X10*3/UL (ref 0–0.1)
BASOPHILS NFR BLD AUTO: 0.3 %
BILIRUB SERPL-MCNC: 0.6 MG/DL (ref 0–1.2)
BILIRUB UR STRIP.AUTO-MCNC: NEGATIVE MG/DL
BUN SERPL-MCNC: 16 MG/DL (ref 6–23)
CALCIUM SERPL-MCNC: 9.3 MG/DL (ref 8.6–10.6)
CHLORIDE SERPL-SCNC: 106 MMOL/L (ref 98–107)
CO2 SERPL-SCNC: 23 MMOL/L (ref 21–32)
COLOR UR: NORMAL
CREAT SERPL-MCNC: 0.97 MG/DL (ref 0.5–1.05)
EGFRCR SERPLBLD CKD-EPI 2021: 74 ML/MIN/1.73M*2
EOSINOPHIL # BLD AUTO: 0.11 X10*3/UL (ref 0–0.7)
EOSINOPHIL NFR BLD AUTO: 2.8 %
ERYTHROCYTE [DISTWIDTH] IN BLOOD BY AUTOMATED COUNT: 11.5 % (ref 11.5–14.5)
GLUCOSE SERPL-MCNC: 124 MG/DL (ref 74–99)
GLUCOSE UR STRIP.AUTO-MCNC: NORMAL MG/DL
HCT VFR BLD AUTO: 40.5 % (ref 36–46)
HGB BLD-MCNC: 13.7 G/DL (ref 12–16)
HOLD SPECIMEN: NORMAL
IMM GRANULOCYTES # BLD AUTO: 0.02 X10*3/UL (ref 0–0.7)
IMM GRANULOCYTES NFR BLD AUTO: 0.5 % (ref 0–0.9)
KETONES UR STRIP.AUTO-MCNC: NEGATIVE MG/DL
LEUKOCYTE ESTERASE UR QL STRIP.AUTO: NEGATIVE
LYMPHOCYTES # BLD AUTO: 1.57 X10*3/UL (ref 1.2–4.8)
LYMPHOCYTES NFR BLD AUTO: 40.2 %
MAGNESIUM SERPL-MCNC: 2.19 MG/DL (ref 1.6–2.4)
MCH RBC QN AUTO: 30 PG (ref 26–34)
MCHC RBC AUTO-ENTMCNC: 33.8 G/DL (ref 32–36)
MCV RBC AUTO: 89 FL (ref 80–100)
MONOCYTES # BLD AUTO: 0.49 X10*3/UL (ref 0.1–1)
MONOCYTES NFR BLD AUTO: 12.5 %
NEUTROPHILS # BLD AUTO: 1.71 X10*3/UL (ref 1.2–7.7)
NEUTROPHILS NFR BLD AUTO: 43.7 %
NITRITE UR QL STRIP.AUTO: NEGATIVE
NRBC BLD-RTO: 0 /100 WBCS (ref 0–0)
PH UR STRIP.AUTO: 5 [PH]
PLATELET # BLD AUTO: 206 X10*3/UL (ref 150–450)
POTASSIUM SERPL-SCNC: 5.3 MMOL/L (ref 3.5–5.3)
PREGNANCY TEST URINE, POC: NEGATIVE
PROT SERPL-MCNC: 7.6 G/DL (ref 6.4–8.2)
PROT UR STRIP.AUTO-MCNC: NEGATIVE MG/DL
RBC # BLD AUTO: 4.57 X10*6/UL (ref 4–5.2)
RBC # UR STRIP.AUTO: NEGATIVE /UL
SODIUM SERPL-SCNC: 136 MMOL/L (ref 136–145)
SP GR UR STRIP.AUTO: 1.02
UROBILINOGEN UR STRIP.AUTO-MCNC: NORMAL MG/DL
WBC # BLD AUTO: 3.9 X10*3/UL (ref 4.4–11.3)

## 2024-11-10 PROCEDURE — 73562 X-RAY EXAM OF KNEE 3: CPT | Mod: LEFT SIDE | Performed by: RADIOLOGY

## 2024-11-10 PROCEDURE — 85025 COMPLETE CBC W/AUTO DIFF WBC: CPT | Performed by: EMERGENCY MEDICINE

## 2024-11-10 PROCEDURE — 73590 X-RAY EXAM OF LOWER LEG: CPT | Mod: LT

## 2024-11-10 PROCEDURE — 2500000001 HC RX 250 WO HCPCS SELF ADMINISTERED DRUGS (ALT 637 FOR MEDICARE OP): Performed by: EMERGENCY MEDICINE

## 2024-11-10 PROCEDURE — 36415 COLL VENOUS BLD VENIPUNCTURE: CPT | Performed by: EMERGENCY MEDICINE

## 2024-11-10 PROCEDURE — 99284 EMERGENCY DEPT VISIT MOD MDM: CPT | Performed by: EMERGENCY MEDICINE

## 2024-11-10 PROCEDURE — 73590 X-RAY EXAM OF LOWER LEG: CPT | Mod: LEFT SIDE | Performed by: RADIOLOGY

## 2024-11-10 PROCEDURE — 84075 ASSAY ALKALINE PHOSPHATASE: CPT | Performed by: EMERGENCY MEDICINE

## 2024-11-10 PROCEDURE — 2500000004 HC RX 250 GENERAL PHARMACY W/ HCPCS (ALT 636 FOR OP/ED): Performed by: EMERGENCY MEDICINE

## 2024-11-10 PROCEDURE — 73562 X-RAY EXAM OF KNEE 3: CPT | Mod: LT

## 2024-11-10 PROCEDURE — 96372 THER/PROPH/DIAG INJ SC/IM: CPT | Performed by: EMERGENCY MEDICINE

## 2024-11-10 PROCEDURE — 99284 EMERGENCY DEPT VISIT MOD MDM: CPT | Mod: 25

## 2024-11-10 PROCEDURE — 81003 URINALYSIS AUTO W/O SCOPE: CPT | Performed by: EMERGENCY MEDICINE

## 2024-11-10 PROCEDURE — 83735 ASSAY OF MAGNESIUM: CPT | Performed by: EMERGENCY MEDICINE

## 2024-11-10 RX ORDER — PREDNISONE 50 MG/1
50 TABLET ORAL DAILY
Qty: 5 TABLET | Refills: 0 | Status: SHIPPED | OUTPATIENT
Start: 2024-11-10 | End: 2024-11-15

## 2024-11-10 RX ORDER — KETOROLAC TROMETHAMINE 15 MG/ML
15 INJECTION, SOLUTION INTRAMUSCULAR; INTRAVENOUS ONCE
Status: DISCONTINUED | OUTPATIENT
Start: 2024-11-10 | End: 2024-11-10

## 2024-11-10 RX ORDER — OXYCODONE AND ACETAMINOPHEN 5; 325 MG/1; MG/1
1 TABLET ORAL ONCE
Status: COMPLETED | OUTPATIENT
Start: 2024-11-10 | End: 2024-11-10

## 2024-11-10 RX ORDER — KETOROLAC TROMETHAMINE 30 MG/ML
30 INJECTION, SOLUTION INTRAMUSCULAR; INTRAVENOUS ONCE
Status: COMPLETED | OUTPATIENT
Start: 2024-11-10 | End: 2024-11-10

## 2024-11-10 RX ORDER — OXYCODONE AND ACETAMINOPHEN 5; 325 MG/1; MG/1
1 TABLET ORAL EVERY 6 HOURS PRN
Qty: 12 TABLET | Refills: 0 | Status: SHIPPED | OUTPATIENT
Start: 2024-11-10 | End: 2024-11-13

## 2024-11-10 ASSESSMENT — PAIN SCALES - GENERAL
PAINLEVEL_OUTOF10: 9
PAINLEVEL_OUTOF10: 9
PAINLEVEL_OUTOF10: 7
PAINLEVEL_OUTOF10: 9

## 2024-11-10 ASSESSMENT — COLUMBIA-SUICIDE SEVERITY RATING SCALE - C-SSRS
1. IN THE PAST MONTH, HAVE YOU WISHED YOU WERE DEAD OR WISHED YOU COULD GO TO SLEEP AND NOT WAKE UP?: NO
2. HAVE YOU ACTUALLY HAD ANY THOUGHTS OF KILLING YOURSELF?: NO
6. HAVE YOU EVER DONE ANYTHING, STARTED TO DO ANYTHING, OR PREPARED TO DO ANYTHING TO END YOUR LIFE?: NO

## 2024-11-10 ASSESSMENT — PAIN - FUNCTIONAL ASSESSMENT: PAIN_FUNCTIONAL_ASSESSMENT: 0-10

## 2024-11-10 NOTE — ED TRIAGE NOTES
L. Leg, and toes numbness and pain. Pt states she had an MRI done yesterday of her Leg and hip but unsure of the results. Pt states she is unable to walk for too long or sleep due to the pain x 1 month

## 2024-11-10 NOTE — Clinical Note
Amanda Beverly was seen and treated in our emergency department on 11/10/2024.  She may return to work on 11/14/2024.   can participate in work as a work from home.     If you have any questions or concerns, please don't hesitate to call.      Raghavendra Amaral, DO

## 2024-11-10 NOTE — ED PROVIDER NOTES
HPI   Chief Complaint   Patient presents with    Leg Pain       HPI    HISTORY OF PRESENT ILLNESS:  Patient is a 44-year-old female, -american, history of lupus, glomerulonephritis, Sjogren's who presents to the emergency department for chronic left lower extremity pain.  Patient states that it has been ongoing for the past month, with it associated with numbness.  She states that initially, it was felt to be related to her ovarian cyst.  She then was seen by her rheumatologist for the left lower extremity pain.  Ultimately, she underwent an MRI scan yesterday in the outpatient setting.  Patient states after the scan, she was having worsening numbness with her left lower extremity, from the knee below.  She had taken home medications including Celebrex, topiramate without significant relief.  Denies any saddle anesthesia, weakness, urinary or bowel incontinence.  Does state that because of the leg pain, she is having difficulty with walking.  No significant joint swelling.  Denied any recent trauma.  Denied any history of DVT or PE.  Denied any rash, fever, chills, nausea, vomiting, diarrhea.    Past Medical History: Lupus,: glomeronephritis, Sjogren's disease, obesity, depression, vitamin D deficiency, known sacroiliitis  Past Surgical History: Ovarian cyst surgery  Family History: family history not pertinent to presenting problem or chief complaint  Social History: Patient denies cigarette smoking, EtOH use, recreational drug use    __________________________________________________________  PHYSICAL EXAM:    Appearance: Alert, oriented , cooperative   Skin: Intact,  dry skin, no lesions, rash, petechiae or purpura.   Eyes: PERRLA, EOMs intact,  Conjunctiva pink with no redness or exudates.    HENT: Normocephalic, atraumatic. Nares patent   Neck: Supple. Trachea at midline.   Pulmonary: Lung sounds are clear bilaterally.  There is no rales, rhonchi, or wheezing.  Cardiac: Regular rate and rhythm, no rubs,  murmurs, or gallops. No JVD,   Abdomen: Abdomen is soft, nontender, and nondistended.  No palpable organomegaly.  No rebound or guarding.  No CVA tenderness. Nonsurgical abdomen  Genitourinary: Exam deferred.  Musculoskeletal: no edema, pain, cyanosis, or deformity in extremities. Pulses full and equal.   Neurological:  Cranial nerves are grossly intact, no weakness, no focal findings identified.  Sensation of the left lower extremity was from the knee down.  States that to light touch, there is diminished on both medial and lateral aspects.  Sharp sensation intact.    __________________________________________________________  MEDICAL DECISION MAKING:    Patient was seen and examined. Differential diagnosis for pain and Numbness includes sciatica, pinched nerve, musculoskeletal pain, electrolyte disturbance.  Patient presents to the emergency department for chronic left lower extremity pain in addition to numbness.  There is no red flags for cord compression.  No IV drug use history.  No urinary bowel incontinence, saddle anesthesia.  Physical exam showed subjective numbness but no weakness.  Patient was undergoing outpatient workup. Patient MRI scan including lumbar and left hip had been read.  Stated that there was evidence of bilateral sacroiliitis concerning for inflammatory and structural changes.  There is also questionable anterior superior chondral labral junctional tear.  Patient was not giving any red flags for cord compression at this time.  Distribution of numbness was from below the knee and down. MRI scan was notable for a nerve that was pinched.  After pain meds, patient was able to stand and ambulate.  Labs were without a significant electrolyte abnormality.  At this time, patient felt comfortable being discharged.  Neurosurgery follow-up was scheduled for 2 weeks.  Patient also has follow-up with her primary care physician.  Explained return precautions for worsening neurologic symptoms including  red flags.  Patient was described pain medicine in addition to steroids.  Patient verbalized understanding, agreed to plan, and was discharged from the emergency department.    Chronic Medical Conditions Significantly Affecting Care: Lupus, glomeronephritis, Sjogren's disease, obesity,    External Records Reviewed: I reviewed recent and relevant outside records including: Rheumatology progress note from October 28, 2024    [facility]  Emergency Medicine    Patient History   Past Medical History:   Diagnosis Date    Acute candidiasis of vulva and vagina 08/07/2019    Vaginal candidiasis    Acute vaginitis 03/01/2022    Bacterial vaginosis    Acute vaginitis 07/03/2018    Bacterial vaginosis    Acute vaginitis 08/07/2019    Bacterial vaginosis    Anemia, unspecified 08/10/2015    Low hemoglobin    Benign paroxysmal vertigo, unspecified ear 08/20/2014    Benign paroxysmal positional vertigo    Body mass index (BMI) 50.0-59.9, adult (Multi) 09/03/2021    Body mass index (BMI) of 50.0 to 59.9 in adult    Contact with and (suspected) exposure to covid-19 07/07/2020    Suspected COVID-19 virus infection    Dietary counseling and surveillance 05/21/2018    Dietary counseling    Dorsalgia, unspecified 04/05/2018    Acute back pain    Dysuria 03/08/2015    Dysuria    Elevated blood-pressure reading, without diagnosis of hypertension 10/07/2017    Elevated blood pressure reading    Encounter for care and examination of lactating mother 12/28/2015    Postpartum care and examination of lactating mother    Encounter for screening for human papillomavirus (HPV)     Screening for HPV (human papillomavirus)    Encounter for screening for lipoid disorders 04/21/2017    Screening for lipid disorders    Encounter for screening for malignant neoplasm of cervix 03/03/2015    Screening for cervical cancer    Exercise counseling 05/21/2018    Exercise counseling    Follicular disorder, unspecified 05/06/2020    Acute folliculitis    Long  term (current) use of systemic steroids 01/29/2016    On prednisone therapy    Morbid (severe) obesity due to excess calories (Multi) 12/12/2019    Obesity, morbid, BMI 50 or higher    Morbid (severe) obesity due to excess calories (Multi) 06/20/2019    Morbid obesity with BMI of 40.0-44.9, adult    Morbid (severe) obesity due to excess calories (Multi) 09/03/2021    Obesity, morbid, BMI 50 or higher    Nasal congestion     Head congestion    Other conditions influencing health status     Arthritis    Other conditions influencing health status     History of burning on urination    Other conditions influencing health status 04/05/2015    History of pregnancy    Other long term (current) drug therapy 09/26/2016    Encounter for long-term current use of high risk medication    Other specified diseases and conditions complicating pregnancy ([facility]) 04/28/2016    Systemic lupus complicating pregnancy    Other specified noninflammatory disorders of vagina     Vaginal irritation    Other specified postprocedural states     History of Papanicolaou smear    Other specified soft tissue disorders 02/08/2015    Right leg swelling    Other symptoms and signs involving the genitourinary system     Sensation of pressure in bladder area    Pain in unspecified ankle and joints of unspecified foot 05/03/2022    Ankle pain    Pain in unspecified foot 08/05/2016    Pains, foot    Pelvic and perineal pain 11/14/2019    Suprapubic pain    Pelvic and perineal pain 06/26/2018    Pelvic pain in female    Pelvic and perineal pain 07/23/2020    Pelvic pressure in female    Personal history of gestational diabetes 12/28/2015    History of gestational diabetes mellitus (GDM)    Personal history of other (healed) physical injury and trauma 09/03/2021    History of strain    Personal history of other diseases of the circulatory system 02/09/2018    History of hypertension    Personal history of other diseases of the digestive system 06/25/2015     History of constipation    Personal history of other diseases of the digestive system 02/18/2016    History of constipation    Personal history of other diseases of the female genital tract 03/19/2021    History of vaginal discharge    Personal history of other diseases of the female genital tract 08/06/2019    History of vaginal discharge    Personal history of other diseases of the female genital tract 08/05/2016    History of vaginal discharge    Personal history of other diseases of the female genital tract 08/05/2021    History of vaginal pruritus    Personal history of other diseases of the musculoskeletal system and connective tissue 02/06/2020    History of low back pain    Personal history of other diseases of the musculoskeletal system and connective tissue 03/26/2016    History of joint pain    Personal history of other diseases of the respiratory system 08/22/2016    History of acute sinusitis    Personal history of other drug therapy 10/05/2017    History of influenza vaccination    Personal history of other drug therapy 09/25/2015    History of influenza vaccination    Personal history of other endocrine, nutritional and metabolic disease 01/28/2016    History of hyperglycemia    Personal history of other infectious and parasitic diseases 07/27/2020    History of trichomonal vaginitis    Personal history of other specified conditions 01/29/2016    History of edema    Personal history of other specified conditions 06/26/2018    History of incontinence of feces    Personal history of other specified conditions 08/06/2019    History of dysuria    Personal history of other specified conditions 02/18/2016    History of urinary frequency    Personal history of other specified conditions 06/20/2016    History of dysuria    Personal history of other specified conditions 08/17/2020    History of dysuria    Personal history of other specified conditions 01/07/2015    History of paresthesia    Personal history  of other specified conditions 08/05/2021    History of abdominal pain    Personal history of other specified conditions 12/23/2021    History of nasal congestion    Personal history of other specified conditions     History of shortness of breath    Personal history of urinary (tract) infections 12/28/2016    History of urinary tract infection    Personal history of urinary (tract) infections 12/29/2016    History of urinary tract infection    Polyphagia 05/11/2021    Polyphagia    Proteinuria, unspecified 08/21/2020    Proteinuria    Radiculopathy, lumbar region 02/10/2020    Acute lumbar radiculopathy    Unspecified urinary incontinence 05/23/2018    Urinary incontinence in female    Urge incontinence 06/26/2018    Urge incontinence of urine    Urinary tract infection, site not specified 08/07/2019    Acute lower UTI    Urinary tract infection, site not specified 08/18/2020    Acute lower UTI     Past Surgical History:   Procedure Laterality Date    ECTOPIC PREGNANCY SURGERY  10/01/2013    Surgical Excision Of Tubal Pregnancy    OTHER SURGICAL HISTORY  10/01/2013    Abdominal Surgery    OTHER SURGICAL HISTORY  05/23/2018    Anal Sphincterectomy    OTHER SURGICAL HISTORY  08/20/2014    Laparoscopic Excision Of Ectopic Pregnancy And Salpingectomy    OTHER SURGICAL HISTORY  08/20/2014    Ovarian Cystectomy     Family History   Problem Relation Name Age of Onset    Diabetes Mother      Other (Breast lump) Mother          benign    Hypotension Father      Diabetes Sister      Hypertension Brother          borderline     Social History     Tobacco Use    Smoking status: Never     Passive exposure: Current    Smokeless tobacco: Never   Vaping Use    Vaping status: Never Used   Substance Use Topics    Alcohol use: Not Currently    Drug use: Never       Physical Exam   ED Triage Vitals   Temperature Heart Rate Respirations BP   11/10/24 0902 11/10/24 0902 11/10/24 0902 11/10/24 0902   36.1 °C (97 °F) 80 18 140/85       Pulse Ox Temp Source Heart Rate Source Patient Position   11/10/24 0902 11/10/24 0912 11/10/24 0912 11/10/24 0912   98 % Oral Monitor Sitting      BP Location FiO2 (%)     11/10/24 0912 --     Left arm        Physical Exam      ED Course & [facility]   ED Course as of 11/10/24 1230   Sun Nov 10, 2024   0936 Called radops to have lumbar MRI read for the ED visit [WJ]   1024 MRI from yesterday resulted -   IMPRESSION:  Minimal interval increased disc height loss at L4-L5. Unchanged size and extent of a moderate-sized left subarticular disc extrusion  effacing the left lateral recess and compressing the descending left L5 nerve root. Additional disc bulge and endplate spurring results in  severe left/moderate right neural foraminal narrowing with compression of the exiting left L4 nerve root, also similar in appearance.   [WJ]   1025 This lumbar was explained the patient's symptoms.  Overall findings does appear unchanged.  Patient is having progression of pain and numbness.  Disposition will be dependent on whether or not pain is controlled with the patient able to ambulate. Updated patient on results [WJ]   1115 XR tibia fibula left 2 views  Xrays were independently reviewed and not showing obvious fracture or dislocation. [WJ]   1145 Patient's OARRS report reviewed and negative. [WJ]      ED Course User Index  [WJ] Raghavendra Amaral DO         Diagnoses as of 11/10/24 1230   Lumbar radiculopathy                 No data recorded     [place] Coma Scale Score: 15 (11/10/24 0902 : Yarelis Bonner RN)                           Medical Decision Making      Procedure  Procedures     Raghavendra Amaral DO  11/10/24 1236

## 2024-11-11 ENCOUNTER — APPOINTMENT (OUTPATIENT)
Dept: PRIMARY CARE | Facility: CLINIC | Age: 44
End: 2024-11-11
Payer: COMMERCIAL

## 2024-11-13 ENCOUNTER — APPOINTMENT (OUTPATIENT)
Dept: PRIMARY CARE | Facility: CLINIC | Age: 44
End: 2024-11-13
Payer: COMMERCIAL

## 2024-11-13 VITALS
BODY MASS INDEX: 45.99 KG/M2 | DIASTOLIC BLOOD PRESSURE: 87 MMHG | WEIGHT: 293 LBS | HEIGHT: 67 IN | OXYGEN SATURATION: 96 % | SYSTOLIC BLOOD PRESSURE: 139 MMHG | HEART RATE: 80 BPM

## 2024-11-13 DIAGNOSIS — M25.552 BILATERAL HIP PAIN: ICD-10-CM

## 2024-11-13 DIAGNOSIS — G62.9 NEUROPATHY: ICD-10-CM

## 2024-11-13 DIAGNOSIS — E66.01 MORBID OBESITY WITH BMI OF 50.0-59.9, ADULT (MULTI): ICD-10-CM

## 2024-11-13 DIAGNOSIS — M46.1 SACROILIITIS (CMS-HCC): ICD-10-CM

## 2024-11-13 DIAGNOSIS — Z23 ENCOUNTER FOR IMMUNIZATION: ICD-10-CM

## 2024-11-13 DIAGNOSIS — F32.5 MAJOR DEPRESSIVE DISORDER WITH SINGLE EPISODE, IN FULL REMISSION (CMS-HCC): Primary | ICD-10-CM

## 2024-11-13 DIAGNOSIS — M25.551 BILATERAL HIP PAIN: ICD-10-CM

## 2024-11-13 PROCEDURE — 90471 IMMUNIZATION ADMIN: CPT | Performed by: FAMILY MEDICINE

## 2024-11-13 PROCEDURE — 90656 IIV3 VACC NO PRSV 0.5 ML IM: CPT | Performed by: FAMILY MEDICINE

## 2024-11-13 PROCEDURE — 1036F TOBACCO NON-USER: CPT | Performed by: FAMILY MEDICINE

## 2024-11-13 PROCEDURE — 3008F BODY MASS INDEX DOCD: CPT | Performed by: FAMILY MEDICINE

## 2024-11-13 PROCEDURE — 99214 OFFICE O/P EST MOD 30 MIN: CPT | Performed by: FAMILY MEDICINE

## 2024-11-13 RX ORDER — METHOCARBAMOL 500 MG/1
500 TABLET, FILM COATED ORAL 3 TIMES DAILY PRN
Qty: 40 TABLET | Refills: 1 | Status: SHIPPED | OUTPATIENT
Start: 2024-11-13

## 2024-11-13 RX ORDER — TOPIRAMATE 100 MG/1
100 TABLET, FILM COATED ORAL 2 TIMES DAILY
Qty: 180 TABLET | Refills: 0 | Status: SHIPPED | OUTPATIENT
Start: 2024-11-13

## 2024-11-13 NOTE — LETTER
Amanda Beverly  1980 11/13/2024    To Whom This May Concern:    My patient, Amanda Beverly, is unable to sit in the car for more than 40 mins at a time due to her back issues. She is advised to continue this for 6 weeks, until 12/25/2024  Should you have any questions please do not hesitate to call.    Thank you for your cooperation.    Sincerely,    Bee Arellano MD

## 2024-11-13 NOTE — PROGRESS NOTES
"Subjective   Patient ID: Amanda Beverly is a 44 y.o. female who presents for Follow-up.    HPI   She presents for a follow up today. She was recently seen in ER on 11/10/24 for chronic left lower extremity pain, was prescribed prednisone 5 day course, and oxycodone.  She continues to experience pain, that started from her lower back and radiates down to her leg. Has not done PT. Her MRI scan was notable for a nerve that was pinched.       Review of Systems  Constitutional: No fever or chills  Cardiovascular: no chest pain, no palpitations and no syncope.   Respiratory: no cough, no shortness of breath during exertion and no shortness of breath at rest.   Gastrointestinal: no abdominal pain, no nausea and no vomiting.  Neuro: No Headache, no dizziness    Objective   /87   Pulse 80   Ht 1.702 m (5' 7\")   Wt (!) 159 kg (350 lb)   LMP 09/29/2024 (Approximate)   SpO2 96%   BMI 54.82 kg/m²     Physical Exam  Constitutional: Alert and in no acute distress. Well developed, well nourished  Head and Face: Head and face: Normal.    Cardiovascular: Heart rate and rhythm were normal, normal S1 and S2. No peripheral edema.   Pulmonary: No respiratory distress. Clear bilateral breath sounds.  Musculoskeletal: Gait and station: Normal. Muscle strength/tone: Normal.   Skin: Normal skin color and pigmentation, normal skin turgor, and no rash.    Psychiatric: Judgment and insight: Intact. Mood and affect: Normal.    Lab Results   Component Value Date    WBC 3.9 (L) 11/10/2024    HGB 13.7 11/10/2024    HCT 40.5 11/10/2024     11/10/2024    CHOL 152 04/04/2024    TRIG 66 04/04/2024    HDL 43.0 04/04/2024    ALT 14 11/10/2024    AST 29 11/10/2024     11/10/2024    K 5.3 11/10/2024     11/10/2024    CREATININE 0.97 11/10/2024    BUN 16 11/10/2024    CO2 23 11/10/2024    TSH 1.29 04/04/2024    HGBA1C 4.9 04/04/2024       XR knee left 3 views  Narrative: STUDY:  Knee Radiographs; 11/10/2024 9:51 " AM  INDICATION:  Left leg pain with numbness.  No known injury.  COMPARISON:  None.  ACCESSION NUMBER(S):  [id number]  ORDERING CLINICIAN:  SHARLA DAVID  TECHNIQUE:  Three view(s) of the left knee.  FINDINGS:    There is no displaced fracture.  The alignment is anatomic.  No soft  tissue abnormality is seen.  There is no joint effusion.  Impression: No acute fracture or subluxation.  Signed by Shane Reilly MD  XR tibia fibula left 2 views  Narrative: STUDY:  Tibia and Fibula Radiographs; 11/10/2024 9:51AM  INDICATION:  Left leg pain and numbness with no known injury.  COMPARISON:  None Available.  ACCESSION NUMBER(S):  [id number]  ORDERING CLINICIAN:  SHARLA DAVID  TECHNIQUE:  Two view(s)(four images) of the left tibia and fibula.  FINDINGS:    There is no displaced fracture.  The alignment is anatomic.  No soft  tissue abnormality is seen.  Impression: No acute abnormality.  Signed by Chuck Montez MD  MR lumbar spine wo IV contrast  Narrative: Interpreted By:  Domenico Martinez,   STUDY:  MR LUMBAR SPINE WO IV CONTRAST performed 11/9/2024 8:44 am      INDICATION:  Signs/Symptoms:followup last lumbar mri, concern for worsening neural  foraminal stenosis requiring epidural injections.      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      ACCESSION NUMBER(S):  [id number]      ORDERING CLINICIAN:  JUDITH WERNER      TECHNIQUE:  Multiplanar multisequence MR imaging of the lumbar spine performed  without intravenous contrast. Sagittal T1, T2, STIR, axial T1 and T2  weighted images of the lumbar spine were acquired.      FINDINGS:  Segmentation: Normal.      Conus: The lower thoracic cord appears unremarkable. The  conus  terminates at the level of the superior endplate of L1. Cauda equina  are unremarkable.      Epidural fluid: None.      Alignment: Within normal limits.      Vertebral bodies: Vertebral body heights are grossly maintained.      Marrow signal: Type 2 Modic endplate signal change at L4-L5. No focal  STIR hyperintensity/marrow edema within the lumbar region.      Intervertebral discs: Moderate degenerative disc height loss at  L4-L5, minimally worsened in the interim. Disc desiccation at this  level is again evident. Remaining lumbar intervertebral discs are  well preserved.          Degenerative change:      T12-L1: Unremarkable.      L1-2: Unremarkable.      L2-3: Mild ligamentum flavum thickening. No spinal canal stenosis or  significant neural foraminal narrowing.      L3-4: Mild bilateral facet arthropathy. Moderate ligamentum flavum  hypertrophy. No significant disc bulge. No spinal canal stenosis. No  substantial neural foraminal narrowing.      L4-5: Mild bilateral facet arthropathy. Slight ligamentum flavum  hypertrophy. Moderate disc bulge with endplate spurring and  superimposed left subarticular disc extrusion component. There is  mild cranial and caudal migration of disc components measuring up to  1.5 cm in craniocaudad extent (series 3, image 15), unchanged in size  and appearance. There is similar effacement of the left lateral  recess and compression of the descending left L5 nerve root. Mild  crowding of the right lateral recess is also unchanged. There is  otherwise mild spinal canal stenosis. Moderate right and severe left  neural foraminal narrowing with compression of the exiting left L4  nerve root, unchanged.      L5-S1: No spinal canal stenosis or neural foraminal narrowing.      Soft tissues: The prevertebral and posterior paraspinal soft tissues  are unremarkable.      Impression: Minimal interval increased disc height loss at L4-L5. Unchanged size  and extent of a moderate-sized left  subarticular disc extrusion  effacing the left lateral recess and compressing the descending left  L5 nerve root. Additional disc bulge and endplate spurring results in  severe left/moderate right neural foraminal narrowing with  compression of the exiting left L4 nerve root, also similar in  appearance.      No additional spinal canal stenosis or neural foraminal narrowing at  the remaining lumbar levels.      MACRO:  None      Signed by: Domenico Martinez 11/10/2024 10:03 AM  Dictation workstation:   [id number]      Assessment/Plan   Assessment & Plan  Major depressive disorder with single episode, in full remission (CMS-HCC)  Continue Zoloft.   Orders:    Follow Up In [facility] - PCP - Established    Morbid obesity with BMI of 50.0-59.9, adult (Multi)  Will check if Wegovy or Zepbound is covered with insurancr.        Bilateral hip pain  Complete the prednisone course. Start Robaxin 500 mg TID PRN for muscle spasms and increase the Topamax to 100 mg BID. Follow up with pain medicine and start physical therapy.     Orders:    methocarbamol (Robaxin) 500 mg tablet; Take 1 tablet (500 mg) by mouth 3 times a day as needed for muscle spasms.    topiramate (Topamax) 100 mg tablet; Take 1 tablet (100 mg) by mouth 2 times a day.    Encounter for immunization  Administered flu shot in office today.   Orders:    Flu vaccine, trivalent, preservative free, age 6 months and greater (Fluarix/Fluzone/Flulaval)    Sacroiliitis (CMS-[facility])  Continue Topamax 100 BID.   Orders:    topiramate (Topamax) 100 mg tablet; Take 1 tablet (100 mg) by mouth 2 times a day.    Neuropathy  Continue Topamax 100 BID.   Orders:    topiramate (Topamax) 100 mg tablet; Take 1 tablet (100 mg) by mouth 2 times a day.      Your yearly Physical is due in: April 2025  When you call the office for your yearly Physical, please ask them to inform me to order your blood work, so that you can get the fasting blood work before your appointment and we  can discuss the results at your physical.      Please call me if any questions arise from now until your next visit. I will call you after I am done seeing patients. A Doctor is always available by phone when the office is closed. Please feel free to call for help with any problem that you feel shouldn't wait until the office re-opens.     Scribe Attestation  By signing my name below, Samra WOLF Scribe   attest that this documentation has been prepared under the direction and in the presence of Bee Arellano MD.

## 2024-11-13 NOTE — ASSESSMENT & PLAN NOTE
Complete the prednisone course. Start Robaxin 500 mg TID PRN for muscle spasms and increase the Topamax to 100 mg BID. Follow up with pain medicine and start physical therapy.     Orders:    methocarbamol (Robaxin) 500 mg tablet; Take 1 tablet (500 mg) by mouth 3 times a day as needed for muscle spasms.    topiramate (Topamax) 100 mg tablet; Take 1 tablet (100 mg) by mouth 2 times a day.

## 2024-11-13 NOTE — ASSESSMENT & PLAN NOTE
Continue Topamax 100 BID.   Orders:    topiramate (Topamax) 100 mg tablet; Take 1 tablet (100 mg) by mouth 2 times a day.

## 2024-11-20 ENCOUNTER — APPOINTMENT (OUTPATIENT)
Dept: ORTHOPEDIC SURGERY | Facility: CLINIC | Age: 44
End: 2024-11-20
Payer: COMMERCIAL

## 2024-11-20 DIAGNOSIS — M54.16 LUMBAR RADICULOPATHY: ICD-10-CM

## 2024-11-20 PROCEDURE — 99203 OFFICE O/P NEW LOW 30 MIN: CPT | Performed by: PHYSICIAN ASSISTANT

## 2024-11-20 PROCEDURE — 99213 OFFICE O/P EST LOW 20 MIN: CPT | Performed by: PHYSICIAN ASSISTANT

## 2024-11-20 PROCEDURE — 1036F TOBACCO NON-USER: CPT | Performed by: PHYSICIAN ASSISTANT

## 2024-11-20 RX ORDER — PREDNISONE 20 MG/1
20 TABLET ORAL DAILY
Qty: 7 TABLET | Refills: 0 | Status: SHIPPED | OUTPATIENT
Start: 2024-11-20 | End: 2024-11-27

## 2024-11-20 ASSESSMENT — PAIN SCALES - GENERAL: PAINLEVEL_OUTOF10: 10 - WORST POSSIBLE PAIN

## 2024-11-20 ASSESSMENT — PAIN DESCRIPTION - DESCRIPTORS: DESCRIPTORS: NUMBNESS;TINGLING

## 2024-11-20 ASSESSMENT — PAIN - FUNCTIONAL ASSESSMENT: PAIN_FUNCTIONAL_ASSESSMENT: 0-10

## 2024-11-20 NOTE — PROGRESS NOTES
is a 44-year-old female who presents today following an emergency room workup for low back pain with left lower extremity radiculopathy.    Patient states that there was no injury specific to this but initially she thought it was an ovarian cyst ruptured and she followed up with her OB who ruled that out and then she followed up with rheumatology to rule out any of her autoimmune disease and they ruled that out as well.    Patient states that she has had a long history of low back pain and had an MRI done years ago but symptoms were pretty manageable then.  Today she states that she has bilateral hip pain, numbness in her left leg and foot.  She states she has not had feeling in her left foot for about a week and a half.    She ended up in the emergency room November 7 and they told her that she had inflammation of the hip joints bilaterally, gave her Toradol injection, a Dosepak and discharged her home.  They put her off work until the 14th and then she followed up with her primary who ended up putting her off work for 6 weeks.    The patient is ambulating without any assistance.  She has control of her bowel and bladder.  She is not dragging or tripping over her feet.    From a treatment standpoint she has not done any treatment yet but she is scheduled to meet with pain management December 10 and she is scheduled to start physical therapy November 26.  She is taking Celebrex and I did encourage her to continue to do so.  She will continue with all the conservative care and then follow-up with us in 6 to 8 weeks for recheck.    This note was dictated using speech recognition software and was not corrected for spelling or grammatical errors

## 2024-11-26 ENCOUNTER — EVALUATION (OUTPATIENT)
Dept: PHYSICAL THERAPY | Facility: CLINIC | Age: 44
End: 2024-11-26
Payer: COMMERCIAL

## 2024-11-26 ENCOUNTER — APPOINTMENT (OUTPATIENT)
Dept: ORTHOPEDIC SURGERY | Facility: CLINIC | Age: 44
End: 2024-11-26
Payer: COMMERCIAL

## 2024-11-26 DIAGNOSIS — S73.192A TEAR OF LEFT ACETABULAR LABRUM, INITIAL ENCOUNTER: ICD-10-CM

## 2024-11-26 DIAGNOSIS — M25.552 LEFT HIP PAIN: ICD-10-CM

## 2024-11-26 DIAGNOSIS — M48.062 SPINAL STENOSIS OF LUMBAR REGION WITH NEUROGENIC CLAUDICATION: ICD-10-CM

## 2024-11-26 DIAGNOSIS — M54.16 LUMBAR RADICULOPATHY: Primary | ICD-10-CM

## 2024-11-26 DIAGNOSIS — M46.1 SACROILIITIS (CMS-HCC): ICD-10-CM

## 2024-11-26 PROCEDURE — 97162 PT EVAL MOD COMPLEX 30 MIN: CPT | Mod: GP | Performed by: PHYSICAL THERAPIST

## 2024-11-26 NOTE — PROGRESS NOTES
"Physical Therapy    Physical Therapy Evaluation and Treatment    Patient Name: Amanda Beverly  MRN: [id number]  Encounter Date: 11/26/2024     Time Calculation  Start Time: 1505  Stop Time: 1535  Time Calculation (min): 30 min    Current Problem:   1. Lumbar radiculopathy  Follow Up In Physical Therapy      2. Spinal stenosis of lumbar region with neurogenic claudication  Referral to Physical Therapy      3. Left hip pain  Referral to Physical Therapy    Follow Up In Physical Therapy      4. Sacroiliitis (CMS-HCC)  Referral to Physical Therapy      5. Tear of left acetabular labrum, initial encounter  Referral to Physical Therapy        Relevant Past Medical History: alopecia, chronic R knee pain, depression, urinary frequency, nephrotic syndrome, pelvic floor weakness, restless leg syndrome, sacroiliitis, SLR, Sjogren's syndrome, morbid obesity  Surgical History: laparoscopic removal of ectopic pregnancy and salpingectomy, ovarian cystectomy, anal sphincterectomy, abdominal surgery  Medications: imuran, celebrex, zyrtec, evoxac, plaquenil, lisinopril, robaxin, deltasone, zoloft, topamax  Allergies: house dust, lebetalol, pollen extracts    Precautions:   STEADI Fall Risk: not assessed this session (score of 4+ indicates fall risk); fall risk based on L LE weakness       SUBJECTIVE:  Pt with history of lumbar stenosis and prior low back pain reports insidious onset of abdominal pain in August 2024; worked up for cysts which were ruled out. Felt a \"rupturing\" sensation. Had a difficulty time sleeping at night on her sides with pain turning/twisting. Was diagnosed with \"inflamed hips.\" In mid-October she began experiencing intermittent symptoms into the L LE including pain and numbness of the foot. She had an MRI in late October which further aggravated the symptoms. Was started on prednisone; initially eliminated some pressure in the foot; today was last dose. She was referred to PT and is scheduled to see pain " "management in December.     Imaging:  Lumbar MRI 10/28/24  IMPRESSION: Minimal interval increased disc height loss at L4-L5. Unchanged size and extent of a moderate-sized left subarticular disc extrusion effacing the left lateral recess and compressing the descending left L5 nerve root. Additional disc bulge and endplate spurring results in severe left/moderate right neural foraminal narrowing with compression of the exiting left L4 nerve root, also similar in appearance.    Hip MRI 10/28/24  IMPRESSION: Findings of bilateral sacroiliitis with both acute inflammatory and structural changes. Degenerative endplate changes at L4-L5. Questionable left anterosuperior chondrolabral junction tear. Findings of athletic pubalgia.     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[employer], [employer] customer service lead); currently working remote x6 weeks due to symptoms as she is unable to sit for duration of commute  Hobbies: sings in choir (requires prolonged standing)  Home Livin flights of stairs at home    Patient/Family Goal: \"relieve some of pain/numbness, feel toes and foot\"    Outcome Measures: Modified Oswestry:      OBJECTIVE:    Observation/Posture: morbidly obese, flexed trunk posture with L LE propped on table in long sitting HS stretch    Functional Mobility:  Bed mobility: modified independent  Sit to stand: B UE support    Range of " Motion:  LUMBAR ROM AROM    LEFT RIGHT   Sidebend 25% limited L hip pain Central LBP   Rotation 50% limited, L hip pain L hip pain   Flexion 50% limited, hands to patellar tendons, no increased pain   Extension 50% limited, increased pain posterior L thigh      HIP ROM PROM    LEFT RIGHT   Flexion 90, limited by body habitus 90, limited by body habitus   Internal Rotation WFL WFL   External Rotation WFL WFL     Strength:  LE MMT/MYOTOMES LEFT RIGHT   HIP   L2-L3 Flexion 5/5 5/5   KNEE   S2 Flexion 5/5 5/5   L2, L3 Extension 5/5 5/5   ANKLE   L4 Dorsiflexion 5/5 4/5   L5-S1 Plantarflexion 5/5 5/5     Special Testing:  SPECIAL TEST LEFT   Slump (+)   SLR 50% limited; (+) for neural tension     Treatments:  Therapeutic Exercise: (5 minutes)  Due to time constraints, PT demonstrated interventions issued for HEP with handout issued; instructed in discontinuation of performance should she experience an increase in symptoms     OP Education:   Initial evaluation completed, findings and plan of care discussed with patient. Patient education was provided regarding posture with instruction in use of of lumbar roll in sitting, recommended sleep positions with use of pillows for LEs, as well as symptom management through activity modification and hot/cold modalities. Patient performed and was instructed in an individualized HEP with handout issued as below.    Response to treatment: Patient verbalized good understanding of education provided and of interventions issued for HEP. Denied exacerbation of symptoms post treatment.       HEP:  Access Code: [email or web address]  URL: [email or web address]  Date: 11/27/2024  Prepared by: Quynh Hale    Exercises  - Standing Lumbar Extension with Counter  - 3 x daily - 7 x weekly - 10 reps  - Supine Lower Trunk Rotation  - 3 x daily - 7 x weekly - 10 reps  - Supine Transversus Abdominis Bracing - Hands on Stomach  - 3 x daily - 7 x weekly - 10 reps - 3 hold  - Supine Sciatic Nerve  Glide  - 3 x daily - 7 x weekly - 10 reps  - 90/90 spinal decompression  - 3 x daily - 7 x weekly    Patient Education  - Lumbar Disk Herniation  - Sleep Positions  - Office Posture    ASSESSMENT:   Amanda Beverly is a 44 y.o. female presenting with insidious onset of back pain in August 2024 with worsening of symptoms since October 2024 including constant 10/10 L LE pain and numbness of the L foot. MRI (+) moderate-sized left subarticular disc extrusion effacing the left lateral recess and compressing the descending left L5 nerve root as well as additional disc bulge and endplate spurring results in severe left/moderate right neural foraminal narrowing with compression of the exiting left L4 nerve root. Pt presents with limited and painful lumbar and hip ROM with (+) nerve tension tests. Myotomal weakness present in L dorsiflexors. Pt currently significantly limited in positional tolerance impacting her ability to commute to work, stand to perform ADLs/sing in choir, and sleep. Patient would greatly benefit from skilled outpatient physical therapy services to address these impairments to facilitate symptom relief and improved level of function.    Complexity of Evaluation:   Based on the history including personal factors and/or comorbidities, examination of body systems including body structures and function, activity limitations, and/or participation restrictions, as well as clinical presentation, patient meets criteria for a MODERATE complexity evaluation.      PLAN:  OP PT PLAN:  Treatment/Interventions: Aquatic Therapy , Cryotherapy , Dry Needling  , Education/Instruction , Electrical Stimulation , Manual Therapy  , Self care/home management , Taping techniques , Therapeutic activities , and Therapeutic exercise    PT Plan: Skilled PT   PT Frequency: 2 times per week   Duration: 8 weeks  Insurance: 11/25/24: MMO - NO AUTH / 100% COVERAGE thru 12/31/24 / $4000 DEDUCT MET, reverify 1/1/25 / 20 V/yr - 0 used /  [facility] [phone] / ds   Rehab Potential: Fair  Plan of Care Agreement: Patient         Goals:   SHORT TERM GOALS   1) Pt will decrease pain 50% from constant 10-10/10 to intermittent 0-5/10 for improved activity tolerance  2) Pt will improve sitting tolerance to >1 hr to improve ability to commute to work  3) Pt will improve standing tolerance from 10 minutes to >30 minutes to allow for participate in choir    LONG TERM GOALS  1) Pt will demonstrate independence with HEP for self management of condition at discharge  2) Pt will improve lumbar AROM to painfree and WFL in all planes of motion to allow for ADL/IADL performance without limitation  3) Pt will improve score on Modified Oswestry from 56% to <20% to reflect improved level of function  4) Pt will present with centralization of pain from the distal L LE to the back to facilitate improved nerve intergrity and reduce symptoms

## 2024-11-29 ENCOUNTER — APPOINTMENT (OUTPATIENT)
Dept: PHYSICAL THERAPY | Facility: CLINIC | Age: 44
End: 2024-11-29
Payer: COMMERCIAL

## 2024-12-03 ENCOUNTER — TREATMENT (OUTPATIENT)
Dept: PHYSICAL THERAPY | Facility: CLINIC | Age: 44
End: 2024-12-03
Payer: COMMERCIAL

## 2024-12-03 DIAGNOSIS — M54.16 LUMBAR RADICULOPATHY: ICD-10-CM

## 2024-12-03 DIAGNOSIS — M25.552 LEFT HIP PAIN: ICD-10-CM

## 2024-12-03 PROCEDURE — 97110 THERAPEUTIC EXERCISES: CPT | Mod: GP,CQ

## 2024-12-03 NOTE — PROGRESS NOTES
"Physical Therapy    Physical Therapy Treatment    Patient Name: Amanda Beverly  MRN: [id number]  Encounter Date: 12/3/2024     Time Calculation  Start Time: 1445  Stop Time: 1530  Time Calculation (min): 45 min    Visit #: 2  out of 8  Insurance: MMO - NO AUTH / 100% COVERAGE thru 12/31/24 / $4000 DEDUCT MET, reverify 1/1/25 / 20 V/yr - 0 used / [employer] [id number] / ds   Evaluation date: 11/26/24      Current Problem:   1. Left hip pain  Follow Up In Physical Therapy      2. Lumbar radiculopathy  Follow Up In Physical Therapy        SUBJECTIVE:   Currently sx's into left LE from knee down to foot, rates at 10/10. Pt is unable to sleep at night secondary to sx level. Admits to performing \"some\" exercises issued last session      Precautions:   STEADI Fall Risk: not assessed this session (score of 4+ indicates fall risk); fall risk based on L LE weakness     Pain:   Start of session: 10/10       OBJECTIVE:    Treatments:  Therapeutic Exercise: (40 min)  Supine H/L  -LTR 1 x 10 L/R  -abd brace 2 x 10  -abd brace +hip adduction (ball 2 x 10)  Supine knees flexed over orange swiss ball x 10 min (offer relief of sx's0  Standing trunk extension standing at sink , tolerated small range well, if pt increased range sx's provoked in left quad  Standing upon theraball UE press into ball +abd brace 2 x 10  Standing left sciatic nerve glide        HEP:  Access Code: [email or web address]  URL: [email or web address]  Date: 11/27/2024  Prepared by: Quynh Hale     Exercises  - Standing Lumbar Extension with Counter  - 3 x daily - 7 x weekly - 10 reps  - Supine Lower Trunk Rotation  - 3 x daily - 7 x weekly - 10 reps  - Supine Transversus Abdominis Bracing - Hands on Stomach  - 3 x daily - 7 x weekly - 10 reps - 3 hold  - Supine Sciatic Nerve Glide  - 3 x daily - 7 x weekly - 10 reps  - 90/90 spinal decompression  - 3 x daily - 7 x weeklyHEP:     ASSESSMENT:   Focused todays session on neutral core strengthening program, Supine " 90/90 position of Hip/knee spinal decompression offered short term relief of sx's.  Encouraged compliance with HEP .    Post session pain: Denies change      PLAN:  OP PT PLAN:  Treatment/Interventions: Aquatic Therapy , Cryotherapy , Dry Needling  , Education/Instruction , Electrical Stimulation , Manual Therapy  , Self care/home management , Taping techniques , Therapeutic activities , and Therapeutic exercise    PT Plan: Skilled PT   PT Frequency: 2 times per week   Duration: 8 weeks  Insurance: 11/25/24: MMO - NO AUTH / 100% COVERAGE thru 12/31/24 / $4000 DEDUCT MET, reverify 1/1/25 / 20 V/yr - 0 used / [employer] [id number] / ds   Rehab Potential: Fair  Plan of Care Agreement: Patient    Goals:   SHORT TERM GOALS   1) Pt will decrease pain 50% from constant 10-10/10 to intermittent 0-5/10 for improved activity tolerance -PROGRESSING  2) Pt will improve sitting tolerance to >1 hr to improve ability to commute to work -PROGRESSING  3) Pt will improve standing tolerance from 10 minutes to >30 minutes to allow for participate in choir-PROGRESSING     LONG TERM GOALS  1) Pt will demonstrate independence with HEP for self management of condition at discharge -PROGRESSING  2) Pt will improve lumbar AROM to painfree and WFL in all planes of motion to allow for ADL/IADL performance without limitation -PROGRESSING  3) Pt will improve score on Modified Oswestry from 56% to <20% to reflect improved level of function -PROGRESSING  4) Pt will present with centralization of pain from the distal L LE to the back to facilitate improved nerve intergrity and reduce symptoms -PROGRESSING

## 2024-12-05 ENCOUNTER — APPOINTMENT (OUTPATIENT)
Dept: RHEUMATOLOGY | Facility: CLINIC | Age: 44
End: 2024-12-05
Payer: COMMERCIAL

## 2024-12-06 ENCOUNTER — TREATMENT (OUTPATIENT)
Dept: PHYSICAL THERAPY | Facility: CLINIC | Age: 44
End: 2024-12-06
Payer: COMMERCIAL

## 2024-12-06 DIAGNOSIS — M54.16 LUMBAR RADICULOPATHY: ICD-10-CM

## 2024-12-06 DIAGNOSIS — M25.552 LEFT HIP PAIN: ICD-10-CM

## 2024-12-06 PROCEDURE — 97110 THERAPEUTIC EXERCISES: CPT | Mod: GP | Performed by: PHYSICAL THERAPIST

## 2024-12-06 NOTE — PROGRESS NOTES
"Physical Therapy    Physical Therapy Treatment    Patient Name: Amanda Beverly  MRN: [id number]  Encounter Date: 12/6/2024     Time Calculation  Start Time: 0708  Stop Time: 0747  Time Calculation (min): 39 min    Visit #: 3  out of 8  Insurance: MMO - NO AUTH / 100% COVERAGE thru 12/31/24 / $4000 DEDUCT MET, reverify 1/1/25 / 20 V/yr - 0 used / [employer] [phone] / ds   Evaluation date: 11/26/24      Current Problem:   1. Left hip pain  Follow Up In Physical Therapy      2. Lumbar radiculopathy  Follow Up In Physical Therapy          SUBJECTIVE: pt late to appt  Currently sx's into left LE from knee down to foot, rates at \"10/10\", throbbing constant pain.  Pt has appt with PM on 12/10, evaluating for nerve block, had in past and was helpful.   Pt is unable to sleep at night secondary left hip pain.   N/T in foot. Pt denies loss of bowel or bladder.    Precautions:   STEADI Fall Risk: not assessed this session (score of 4+ indicates fall risk); fall risk based on L LE weakness     Pain:   Start of session: 10/10       OBJECTIVE:    EIS 50% loss  4/5 DF weakness left no foot drop in gait   Treatments:  Therapeutic Exercise: (37 min)  Hook:  -LTR 1 x 10 L/R, trial to left only x5 started to increase pain at hip   -TVA 2 x 12/3 sec   -TVA+hip adduction (ball)2 x 12  - TVA hip fall outs x5 ea   Pt declines prone lying stating it puts a lot of pressure on her LB and very uncomfortable   Supine knees flexed over orange swiss ball x 7 min pressure in back better and tingling in leg not as bad   Position as above isometric shoulder extension sub max x10/3 sec   Standing trunk extension standing elbows on wall sags 1x10 6 sec hold no better/worse, 2nd set of 10 pain not changed ,slight increase tingle post     90/90  left/right sciatic nerve glide 1x10 with belt assist        HEP:  Access Code: [email or web address]  URL: [email or web address]  Date: 11/27/2024  Prepared by: Quynh Hale     Exercises  - Standing Lumbar " Extension with Counter  - 3 x daily - 7 x weekly - 10 reps  - Supine Lower Trunk Rotation  - 3 x daily - 7 x weekly - 10 reps  - Supine Transversus Abdominis Bracing - Hands on Stomach  - 3 x daily - 7 x weekly - 10 reps - 3 hold  - Supine Sciatic Nerve Glide  - 3 x daily - 7 x weekly - 10 reps  - 90/90 spinal decompression  - 3 x daily - 7 x weeklyHEP:   TA: 2 min   Demo and pt educated in how to open her left IVF with RT side lying with pillow hip flex/knee and slight rotation to left, pillow between knees and pillow to back     ASSESSMENT:   Supine 90/90 position of Hip/knee spinal decompression decreased sx best  LTR to Left only started to increase sx  Pt felt wall sags was better tolerated vs at sink EIS  Pt can try opening foramen with postioning as discussed on her RT side for sleeping and see if that helps  Pt demo some left foot DF weakness    Post session pain: throbbing pain better foot tingling persisted       PLAN:  Tues pt will see PM see if any updates     Pt can trial wall sags vs at sink if that is better tolerated, pt understands warning signs to stop as well as centralization of sx   Possible side glide assessment     OP PT PLAN:  Treatment/Interventions: Aquatic Therapy , Cryotherapy , Dry Needling  , Education/Instruction , Electrical Stimulation , Manual Therapy  , Self care/home management , Taping techniques , Therapeutic activities , and Therapeutic exercise    PT Plan: Skilled PT   PT Frequency: 2 times per week   Duration: 8 weeks  Insurance: 11/25/24: MMO - NO AUTH / 100% COVERAGE thru 12/31/24 / $4000 DEDUCT MET, reverify 1/1/25 / 20 V/yr - 0 used / [employer] [phone] / ds   Rehab Potential: Fair  Plan of Care Agreement: Patient    Goals:   SHORT TERM GOALS   1) Pt will decrease pain 50% from constant 10-10/10 to intermittent 0-5/10 for improved activity tolerance -PROGRESSING  2) Pt will improve sitting tolerance to >1 hr to improve ability to commute to work -PROGRESSING  3) Pt will  improve standing tolerance from 10 minutes to >30 minutes to allow for participate in choir-PROGRESSING     LONG TERM GOALS  1) Pt will demonstrate independence with HEP for self management of condition at discharge -PROGRESSING  2) Pt will improve lumbar AROM to painfree and WFL in all planes of motion to allow for ADL/IADL performance without limitation -PROGRESSING  3) Pt will improve score on Modified Oswestry from 56% to <20% to reflect improved level of function -PROGRESSING  4) Pt will present with centralization of pain from the distal L LE to the back to facilitate improved nerve intergrity and reduce symptoms -PROGRESSING

## 2024-12-10 ENCOUNTER — OFFICE VISIT (OUTPATIENT)
Dept: PAIN MEDICINE | Facility: HOSPITAL | Age: 44
End: 2024-12-10
Payer: COMMERCIAL

## 2024-12-10 ENCOUNTER — APPOINTMENT (OUTPATIENT)
Dept: PHYSICAL THERAPY | Facility: CLINIC | Age: 44
End: 2024-12-10
Payer: COMMERCIAL

## 2024-12-10 DIAGNOSIS — M48.062 SPINAL STENOSIS OF LUMBAR REGION WITH NEUROGENIC CLAUDICATION: ICD-10-CM

## 2024-12-10 DIAGNOSIS — M54.16 LUMBAR RADICULOPATHY: ICD-10-CM

## 2024-12-10 DIAGNOSIS — M46.1 SACROILIITIS (CMS-HCC): ICD-10-CM

## 2024-12-10 DIAGNOSIS — S73.192A TEAR OF LEFT ACETABULAR LABRUM, INITIAL ENCOUNTER: ICD-10-CM

## 2024-12-10 DIAGNOSIS — M25.552 LEFT HIP PAIN: ICD-10-CM

## 2024-12-10 PROCEDURE — 99214 OFFICE O/P EST MOD 30 MIN: CPT | Performed by: ANESTHESIOLOGY

## 2024-12-10 RX ORDER — HYDROCODONE BITARTRATE AND ACETAMINOPHEN 5; 325 MG/1; MG/1
1 TABLET ORAL 3 TIMES DAILY PRN
Qty: 21 TABLET | Refills: 0 | Status: SHIPPED | OUTPATIENT
Start: 2024-12-10 | End: 2024-12-17

## 2024-12-10 ASSESSMENT — PAIN - FUNCTIONAL ASSESSMENT: PAIN_FUNCTIONAL_ASSESSMENT: 0-10

## 2024-12-10 ASSESSMENT — PAIN SCALES - GENERAL: PAINLEVEL_OUTOF10: 7

## 2024-12-10 NOTE — PROGRESS NOTES
Chief Complaint   Patient presents with   • Leg Pain     45 y/o female c/o leg pain and numbness        HPI     ROS: 13 point review of systems is complete and is negative listed above in HPI    Past Medical History:   Diagnosis Date   • Acute candidiasis of vulva and vagina 08/07/2019    Vaginal candidiasis   • Acute vaginitis 03/01/2022    Bacterial vaginosis   • Acute vaginitis 07/03/2018    Bacterial vaginosis   • Acute vaginitis 08/07/2019    Bacterial vaginosis   • Anemia, unspecified 08/10/2015    Low hemoglobin   • Benign paroxysmal vertigo, unspecified ear 08/20/2014    Benign paroxysmal positional vertigo   • Body mass index (BMI) 50.0-59.9, adult (Multi) 09/03/2021    Body mass index (BMI) of 50.0 to 59.9 in adult   • Contact with and (suspected) exposure to covid-19 07/07/2020    Suspected COVID-19 virus infection   • Dietary counseling and surveillance 05/21/2018    Dietary counseling   • Dorsalgia, unspecified 04/05/2018    Acute back pain   • Dysuria 03/08/2015    Dysuria   • Elevated blood-pressure reading, without diagnosis of hypertension 10/07/2017    Elevated blood pressure reading   • Encounter for care and examination of lactating mother 12/28/2015    Postpartum care and examination of lactating mother   • Encounter for screening for human papillomavirus (HPV)     Screening for HPV (human papillomavirus)   • Encounter for screening for lipoid disorders 04/21/2017    Screening for lipid disorders   • Encounter for screening for malignant neoplasm of cervix 03/03/2015    Screening for cervical cancer   • Exercise counseling 05/21/2018    Exercise counseling   • Follicular disorder, unspecified 05/06/2020    Acute folliculitis   • Long term (current) use of systemic steroids 01/29/2016    On prednisone therapy   • Morbid (severe) obesity due to excess calories (Multi) 12/12/2019    Obesity, morbid, BMI 50 or higher   • Morbid (severe) obesity due to excess calories (Multi) 06/20/2019    Morbid  obesity with BMI of 40.0-44.9, adult   • Morbid (severe) obesity due to excess calories (Multi) 09/03/2021    Obesity, morbid, BMI 50 or higher   • Nasal congestion     Head congestion   • Other conditions influencing health status     Arthritis   • Other conditions influencing health status     History of burning on urination   • Other conditions influencing health status 04/05/2015    History of pregnancy   • Other long term (current) drug therapy 09/26/2016    Encounter for long-term current use of high risk medication   • Other specified diseases and conditions complicating pregnancy ([facility]) 04/28/2016    Systemic lupus complicating pregnancy   • Other specified noninflammatory disorders of vagina     Vaginal irritation   • Other specified postprocedural states     History of Papanicolaou smear   • Other specified soft tissue disorders 02/08/2015    Right leg swelling   • Other symptoms and signs involving the genitourinary system     Sensation of pressure in bladder area   • Pain in unspecified ankle and joints of unspecified foot 05/03/2022    Ankle pain   • Pain in unspecified foot 08/05/2016    Pains, foot   • Pelvic and perineal pain 11/14/2019    Suprapubic pain   • Pelvic and perineal pain 06/26/2018    Pelvic pain in female   • Pelvic and perineal pain 07/23/2020    Pelvic pressure in female   • Personal history of gestational diabetes 12/28/2015    History of gestational diabetes mellitus (GDM)   • Personal history of other (healed) physical injury and trauma 09/03/2021    History of strain   • Personal history of other diseases of the circulatory system 02/09/2018    History of hypertension   • Personal history of other diseases of the digestive system 06/25/2015    History of constipation   • Personal history of other diseases of the digestive system 02/18/2016    History of constipation   • Personal history of other diseases of the female genital tract 03/19/2021    History of vaginal discharge   •  Personal history of other diseases of the female genital tract 08/06/2019    History of vaginal discharge   • Personal history of other diseases of the female genital tract 08/05/2016    History of vaginal discharge   • Personal history of other diseases of the female genital tract 08/05/2021    History of vaginal pruritus   • Personal history of other diseases of the musculoskeletal system and connective tissue 02/06/2020    History of low back pain   • Personal history of other diseases of the musculoskeletal system and connective tissue 03/26/2016    History of joint pain   • Personal history of other diseases of the respiratory system 08/22/2016    History of acute sinusitis   • Personal history of other drug therapy 10/05/2017    History of influenza vaccination   • Personal history of other drug therapy 09/25/2015    History of influenza vaccination   • Personal history of other endocrine, nutritional and metabolic disease 01/28/2016    History of hyperglycemia   • Personal history of other infectious and parasitic diseases 07/27/2020    History of trichomonal vaginitis   • Personal history of other specified conditions 01/29/2016    History of edema   • Personal history of other specified conditions 06/26/2018    History of incontinence of feces   • Personal history of other specified conditions 08/06/2019    History of dysuria   • Personal history of other specified conditions 02/18/2016    History of urinary frequency   • Personal history of other specified conditions 06/20/2016    History of dysuria   • Personal history of other specified conditions 08/17/2020    History of dysuria   • Personal history of other specified conditions 01/07/2015    History of paresthesia   • Personal history of other specified conditions 08/05/2021    History of abdominal pain   • Personal history of other specified conditions 12/23/2021    History of nasal congestion   • Personal history of other specified conditions      History of shortness of breath   • Personal history of urinary (tract) infections 12/28/2016    History of urinary tract infection   • Personal history of urinary (tract) infections 12/29/2016    History of urinary tract infection   • Polyphagia 05/11/2021    Polyphagia   • Proteinuria, unspecified 08/21/2020    Proteinuria   • Radiculopathy, lumbar region 02/10/2020    Acute lumbar radiculopathy   • Unspecified urinary incontinence 05/23/2018    Urinary incontinence in female   • Urge incontinence 06/26/2018    Urge incontinence of urine   • Urinary tract infection, site not specified 08/07/2019    Acute lower UTI   • Urinary tract infection, site not specified 08/18/2020    Acute lower UTI       Past Surgical History:   Procedure Laterality Date   • ECTOPIC PREGNANCY SURGERY  10/01/2013    Surgical Excision Of Tubal Pregnancy   • OTHER SURGICAL HISTORY  10/01/2013    Abdominal Surgery   • OTHER SURGICAL HISTORY  05/23/2018    Anal Sphincterectomy   • OTHER SURGICAL HISTORY  08/20/2014    Laparoscopic Excision Of Ectopic Pregnancy And Salpingectomy   • OTHER SURGICAL HISTORY  08/20/2014    Ovarian Cystectomy       Family History   Problem Relation Name Age of Onset   • Diabetes Mother     • Other (Breast lump) Mother          benign   • Hypotension Father     • Diabetes Sister     • Hypertension Brother          borderline       Social History     Tobacco Use   • Smoking status: Never     Passive exposure: Current   • Smokeless tobacco: Never   Vaping Use   • Vaping status: Never Used   Substance Use Topics   • Alcohol use: Not Currently   • Drug use: Never       Current Outpatient Medications on File Prior to Visit   Medication Sig Dispense Refill   • azaTHIOprine (Imuran) 50 mg tablet Take 1 tablet (50 mg) by mouth once daily. 90 tablet 3   • cetirizine HCl (ZYRTEC ORAL) Take 1 tablet by mouth once daily as needed.     • cevimeline (Evoxac) 30 mg capsule Take 1 capsule (30 mg) by mouth 2 times a day. 180 capsule  3   • hydroxychloroquine (Plaquenil) 200 mg tablet Take 1 tablet (200 mg) by mouth 2 times a day. 180 tablet 3   • lisinopril 20 mg tablet Take 1 tablet (20 mg) by mouth once daily. 90 tablet 1   • methocarbamol (Robaxin) 500 mg tablet Take 1 tablet (500 mg) by mouth 3 times a day as needed for muscle spasms. 40 tablet 1   • sertraline (Zoloft) 50 mg tablet Take 1 tablet (50 mg) by mouth once daily. 90 tablet 1   • topiramate (Topamax) 100 mg tablet Take 1 tablet (100 mg) by mouth 2 times a day. 180 tablet 0     No current facility-administered medications on file prior to visit.        Allergies   Allergen Reactions   • House Dust Unknown   • Labetalol Other     Muscles in legs tightened    • Pollen Extracts Unknown          Imaging:    Physical Exam:  Gen.: Patient appears to be stated age, fair hygiene  Eyes: Pupils are symmetric, conjunctiva is nonicteric and lids without obvious drooping or rash  ENT: Hearing is grossly intact, external ears and nose appear to be without deformity or rash. No lesions or masses noted.  Neck: No JVD noted, tracheal position is midline  Respiratory: No gasping or shortness of breath noted, no use of accessory muscles noted  Cardiovascular: Extremity show no edema or varicosities  Lymph: No lymphadenopathy noted in the anterior cervical regions bilaterally  Skin no rashes or open lesions or ulcers identified on the skin  Musculoskeletal: Gait is grossly normal  Neurologic: Cranial nerves II through XII are grossly intact  Psychiatric:  Patient is alert and oriented x3    Impression/Plan:

## 2024-12-10 NOTE — PROGRESS NOTES
Chief Complaint   Patient presents with    Leg Pain     43 y/o female c/o leg pain and numbness     Subjective   Patient ID: Amanda Beverly is a 44 y.o. female     HPI:  is a 44-year-old female with a history of lupus, glomerulonephritis, Sjogren's who presents for lower back pain, that radiates to her left foot associated with numbness and tingling, that started a month and a half ago, patient describes the pain as sharp constant and throbbing, aggravated by walking, is not relieved by medication, 10 out of 10 in severity.    Patient recently seen by ED and Ortho for her symptoms and was recommended conservative therapy follow-up with chronic pain, patient states that she has been having intermittent weakness that that caused her to fall once.  Denies any saddle anesthesia, weakness, urinary or bowel incontinence.       Physical Therapy:  The patient has completed 2 weeks of PT  Other Conservative Measures she has tried: Injections  Classes of medications tried in the past: Acetaminophen and NSAIDs        Review of Systems   13-point ROS done and negative except for HPI.     Current Outpatient Medications   Medication Instructions    azaTHIOprine (IMURAN) 50 mg, oral, Daily    cetirizine HCl (ZYRTEC ORAL) 1 tablet, Daily PRN    cevimeline (EVOXAC) 30 mg, oral, 2 times daily    hydroxychloroquine (PLAQUENIL) 200 mg, oral, 2 times daily    lisinopril 20 mg, oral, Daily    methocarbamol (ROBAXIN) 500 mg, oral, 3 times daily PRN    sertraline (ZOLOFT) 50 mg, oral, Daily    topiramate (TOPAMAX) 100 mg, oral, 2 times daily       Past Medical History:   Diagnosis Date    Acute candidiasis of vulva and vagina 08/07/2019    Vaginal candidiasis    Acute vaginitis 03/01/2022    Bacterial vaginosis    Acute vaginitis 07/03/2018    Bacterial vaginosis    Acute vaginitis 08/07/2019    Bacterial vaginosis    Anemia, unspecified 08/10/2015    Low hemoglobin    Benign paroxysmal vertigo, unspecified ear 08/20/2014    Benign  paroxysmal positional vertigo    Body mass index (BMI) 50.0-59.9, adult (Multi) 09/03/2021    Body mass index (BMI) of 50.0 to 59.9 in adult    Contact with and (suspected) exposure to covid-19 07/07/2020    Suspected COVID-19 virus infection    Dietary counseling and surveillance 05/21/2018    Dietary counseling    Dorsalgia, unspecified 04/05/2018    Acute back pain    Dysuria 03/08/2015    Dysuria    Elevated blood-pressure reading, without diagnosis of hypertension 10/07/2017    Elevated blood pressure reading    Encounter for care and examination of lactating mother 12/28/2015    Postpartum care and examination of lactating mother    Encounter for screening for human papillomavirus (HPV)     Screening for HPV (human papillomavirus)    Encounter for screening for lipoid disorders 04/21/2017    Screening for lipid disorders    Encounter for screening for malignant neoplasm of cervix 03/03/2015    Screening for cervical cancer    Exercise counseling 05/21/2018    Exercise counseling    Follicular disorder, unspecified 05/06/2020    Acute folliculitis    Long term (current) use of systemic steroids 01/29/2016    On prednisone therapy    Morbid (severe) obesity due to excess calories (Multi) 12/12/2019    Obesity, morbid, BMI 50 or higher    Morbid (severe) obesity due to excess calories (Multi) 06/20/2019    Morbid obesity with BMI of 40.0-44.9, adult    Morbid (severe) obesity due to excess calories (Multi) 09/03/2021    Obesity, morbid, BMI 50 or higher    Nasal congestion     Head congestion    Other conditions influencing health status     Arthritis    Other conditions influencing health status     History of burning on urination    Other conditions influencing health status 04/05/2015    History of pregnancy    Other long term (current) drug therapy 09/26/2016    Encounter for long-term current use of high risk medication    Other specified diseases and conditions complicating pregnancy ([facility]) 04/28/2016     Systemic lupus complicating pregnancy    Other specified noninflammatory disorders of vagina     Vaginal irritation    Other specified postprocedural states     History of Papanicolaou smear    Other specified soft tissue disorders 02/08/2015    Right leg swelling    Other symptoms and signs involving the genitourinary system     Sensation of pressure in bladder area    Pain in unspecified ankle and joints of unspecified foot 05/03/2022    Ankle pain    Pain in unspecified foot 08/05/2016    Pains, foot    Pelvic and perineal pain 11/14/2019    Suprapubic pain    Pelvic and perineal pain 06/26/2018    Pelvic pain in female    Pelvic and perineal pain 07/23/2020    Pelvic pressure in female    Personal history of gestational diabetes 12/28/2015    History of gestational diabetes mellitus (GDM)    Personal history of other (healed) physical injury and trauma 09/03/2021    History of strain    Personal history of other diseases of the circulatory system 02/09/2018    History of hypertension    Personal history of other diseases of the digestive system 06/25/2015    History of constipation    Personal history of other diseases of the digestive system 02/18/2016    History of constipation    Personal history of other diseases of the female genital tract 03/19/2021    History of vaginal discharge    Personal history of other diseases of the female genital tract 08/06/2019    History of vaginal discharge    Personal history of other diseases of the female genital tract 08/05/2016    History of vaginal discharge    Personal history of other diseases of the female genital tract 08/05/2021    History of vaginal pruritus    Personal history of other diseases of the musculoskeletal system and connective tissue 02/06/2020    History of low back pain    Personal history of other diseases of the musculoskeletal system and connective tissue 03/26/2016    History of joint pain    Personal history of other diseases of the respiratory  system 08/22/2016    History of acute sinusitis    Personal history of other drug therapy 10/05/2017    History of influenza vaccination    Personal history of other drug therapy 09/25/2015    History of influenza vaccination    Personal history of other endocrine, nutritional and metabolic disease 01/28/2016    History of hyperglycemia    Personal history of other infectious and parasitic diseases 07/27/2020    History of trichomonal vaginitis    Personal history of other specified conditions 01/29/2016    History of edema    Personal history of other specified conditions 06/26/2018    History of incontinence of feces    Personal history of other specified conditions 08/06/2019    History of dysuria    Personal history of other specified conditions 02/18/2016    History of urinary frequency    Personal history of other specified conditions 06/20/2016    History of dysuria    Personal history of other specified conditions 08/17/2020    History of dysuria    Personal history of other specified conditions 01/07/2015    History of paresthesia    Personal history of other specified conditions 08/05/2021    History of abdominal pain    Personal history of other specified conditions 12/23/2021    History of nasal congestion    Personal history of other specified conditions     History of shortness of breath    Personal history of urinary (tract) infections 12/28/2016    History of urinary tract infection    Personal history of urinary (tract) infections 12/29/2016    History of urinary tract infection    Polyphagia 05/11/2021    Polyphagia    Proteinuria, unspecified 08/21/2020    Proteinuria    Radiculopathy, lumbar region 02/10/2020    Acute lumbar radiculopathy    Unspecified urinary incontinence 05/23/2018    Urinary incontinence in female    Urge incontinence 06/26/2018    Urge incontinence of urine    Urinary tract infection, site not specified 08/07/2019    Acute lower UTI    Urinary tract infection, site not  specified 08/18/2020    Acute lower UTI        Past Surgical History:   Procedure Laterality Date    ECTOPIC PREGNANCY SURGERY  10/01/2013    Surgical Excision Of Tubal Pregnancy    OTHER SURGICAL HISTORY  10/01/2013    Abdominal Surgery    OTHER SURGICAL HISTORY  05/23/2018    Anal Sphincterectomy    OTHER SURGICAL HISTORY  08/20/2014    Laparoscopic Excision Of Ectopic Pregnancy And Salpingectomy    OTHER SURGICAL HISTORY  08/20/2014    Ovarian Cystectomy        Family History   Problem Relation Name Age of Onset    Diabetes Mother      Other (Breast lump) Mother          benign    Hypotension Father      Diabetes Sister      Hypertension Brother          borderline        Allergies   Allergen Reactions    House Dust Unknown    Labetalol Other     Muscles in legs tightened     Pollen Extracts Unknown        Objective     There were no vitals filed for this visit.     Physical Exam  General: NAD, well groomed, well nourished  Eyes: Non-icteric sclera, EOMI  Ears, Nose, Mouth, and Throat: External ears and nose appear to be without deformity or rash. No lesions or masses noted. Hearing is grossly intact.   Neck: Trachea midline  Respiratory: Nonlabored breathing   Cardiovascular: no peripheral edema   Skin: No rashes or open lesions/ulcers identified on skin.    Back:   Palpation: No tenderness to palpation over lumbar paraspinous muscles.   Straight leg raise: positive at 40 degrees on the left  RED positive on the Left posteriorly      Psychiatric: Alert, orientation to person, place, and time. Cooperative.    Imaging personally reviewed and independently interpreted.    MRI Lumber:   IMPRESSION:  Minimal interval increased disc height loss at L4-L5. Unchanged size  and extent of a moderate-sized left subarticular disc extrusion  effacing the left lateral recess and compressing the descending left  L5 nerve root. Additional disc bulge and endplate spurring results in  severe left/moderate right neural foraminal  narrowing with  compression of the exiting left L4 nerve root, also similar in  appearance.      No additional spinal canal stenosis or neural foraminal narrowing at  the remaining lumbar levels.    MRI Hip:   IMPRESSION:  Findings of bilateral sacroiliitis with both acute inflammatory and  structural changes.      Degenerative endplate changes at L4-L5.      Questionable left anterosuperior chondrolabral junction tear.      Findings of athletic pubalgia.        Assessment/Plan    Amanda Beverly is a 44 y.o. female presents for lower back pain, that radiates to her left foot associated with numbness and tingling, that started a month and a half ago, patient describes the pain as sharp constant and throbbing, aggravated by walking, is not relieved by medication, 10 out of 10 in severity, MRI showed moderate-sized left subarticular disc extrusioneffacing the left lateral recess and compressing the descending left L5 nerve root.    Plan:  -Will plan for L4-L5 interlaminar injection, patient had an injection in the past and had significant relief of her symptoms.  The procedure, risk, benefits, alternatives reviewed.    -Continue with physical therapy as able.      Follow up: After procedure    The patient was invited to contact us back anytime with any questions or concerns and follow-up with us in the office as needed.     Diagnoses and all orders for this visit:  Spinal stenosis of lumbar region with neurogenic claudication  -     Referral to Pain Medicine  Left hip pain  -     Referral to Pain Medicine  Sacroiliitis (CMS-HCC)  -     Referral to Pain Medicine  Tear of left acetabular labrum, initial encounter  -     Referral to Pain Medicine      The patient was given an opportunity to ask questions and were answered. The patient verbalized understanding and was agreeable to plan.    The patient was discussed and seen with Dr. Esparza .      Bj Griffin MD  Anesthesiology PGY-2  [facility]

## 2024-12-11 ENCOUNTER — TREATMENT (OUTPATIENT)
Dept: PHYSICAL THERAPY | Facility: CLINIC | Age: 44
End: 2024-12-11
Payer: COMMERCIAL

## 2024-12-11 DIAGNOSIS — M54.16 LUMBAR RADICULOPATHY: ICD-10-CM

## 2024-12-11 DIAGNOSIS — M25.552 LEFT HIP PAIN: ICD-10-CM

## 2024-12-11 PROCEDURE — 97110 THERAPEUTIC EXERCISES: CPT | Mod: GP | Performed by: PHYSICAL THERAPIST

## 2024-12-11 NOTE — PROGRESS NOTES
"Physical Therapy    Physical Therapy Treatment    Patient Name: Amanda Beverly  MRN: [id number]  Encounter Date: 12/11/2024     Time Calculation  Start Time: 1002  Stop Time: 1040  Time Calculation (min): 38 min    Visit #: 4  out of 8  Insurance: MMO - NO AUTH / 100% COVERAGE thru 12/31/24 / $4000 DEDUCT MET, reverify 1/1/25 / 20 V/yr - 0 used / [employer]AILITY [id number] / ds   Evaluation date: 11/26/24      Current Problem:   1. Left hip pain  Follow Up In Physical Therapy      2. Lumbar radiculopathy  Follow Up In Physical Therapy          SUBJECTIVE:   Pt states she followed up with PM yesterday, given Vicodin, mon 30th has pain injection planned  Pt was really sore after seeing PM and states she could hardly walk.  Pt not finding laying on her side any benefit re her pain  Currently sx's into left LE from knee down to foot, rates at \"8/10\", throbbing  pain.     Pt is unable to sleep at night secondary left hip pain.   N/T in foot persists. Pt denies loss of bowel or bladder, or saddle paresthesias    Precautions:   STEADI Fall Risk: not assessed this session (score of 4+ indicates fall risk); fall risk based on L LE weakness     Pain:   Start of session: 8/10       OBJECTIVE:    EIS 50% loss  4/5 DF weakness left no foot drop in gait   Treatments:  Therapeutic Exercise: ( min)  Hook:  -TVA 2 x 12/3 sec   -TVA+hip adduction (ball)2 x 12  - TVA hip fall outs x5 ea   Supine gluteal sets x10   Pt declines prone lying stating it puts a lot of pressure on her LB and very uncomfortable   Supine knees flexed over orange swiss ball x 7 min pressure in back and thigh pain reduced   LTR rock inglegs on  the ball in 90/90 x20   Supine  isometric shoulder extension sub max x12/3 sec   Standing trunk extension standing elbows on wall sags 1x10 6 sec hold no better/worse, 2nd set of 10 pain was getting more proximal and then returned down the leg    Standing side glide trial hips to right x10 initially felt more proximal but once " finished pain increased to 9/10   90/90  left/right sciatic nerve glide 1x10 with belt assist        HEP:  Access Code: [email or web address]  URL: [email or web address]  Date: 11/27/2024  Prepared by: Quynh Hale     Exercises  - Standing Lumbar Extension with Counter  - 3 x daily - 7 x weekly - 10 reps  - Supine Lower Trunk Rotation  - 3 x daily - 7 x weekly - 10 reps  - Supine Transversus Abdominis Bracing - Hands on Stomach  - 3 x daily - 7 x weekly - 10 reps - 3 hold  - Supine Sciatic Nerve Glide  - 3 x daily - 7 x weekly - 10 reps  - 90/90 spinal decompression  - 3 x daily - 7 x weekly  HEP:   TA: 2 min   Demo and pt educated in how to open her left IVF with RT side lying with pillow hip flex/knee and slight rotation to left, pillow between knees and pillow to back     ASSESSMENT:   Supine 90/90 position of Hip/knee spinal decompression decreased sx best  Pt demo some left foot DF weakness  Pt might be a good candidate for water therapy, she has no fear of deep 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    Neve glides done with resting leg between seemed better vs constant tension   Post session pain: 6/10    PLAN:  Pt can trial wall sags as tolerated, pt understands warning signs to stop as well as centralization of sx   See how sx are post her nerve block, water therapy if needed     OP PT PLAN:  Treatment/Interventions: Aquatic Therapy , Cryotherapy , Dry Needling  , Education/Instruction , Electrical Stimulation , Manual Therapy  , Self care/home management , Taping techniques , Therapeutic activities , and Therapeutic exercise    PT Plan: Skilled PT   PT Frequency: 2 times per week   Duration: 8 weeks  Insurance: 11/25/24: MMO - NO AUTH / 100% COVERAGE thru 12/31/24 / $4000 DEDUCT MET, reverify 1/1/25 / 20 V/yr - 0 used / AVAILITY [id number] / ds   Rehab Potential: Fair  Plan of Care Agreement: Patient    Goals:   SHORT TERM GOALS   1) Pt  will decrease pain 50% from constant 10-10/10 to intermittent 0-5/10 for improved activity tolerance -PROGRESSING  2) Pt will improve sitting tolerance to >1 hr to improve ability to commute to work -PROGRESSING  3) Pt will improve standing tolerance from 10 minutes to >30 minutes to allow for participate in choir-PROGRESSING     LONG TERM GOALS  1) Pt will demonstrate independence with HEP for self management of condition at discharge -PROGRESSING  2) Pt will improve lumbar AROM to painfree and WFL in all planes of motion to allow for ADL/IADL performance without limitation -PROGRESSING  3) Pt will improve score on Modified Oswestry from 56% to <20% to reflect improved level of function -PROGRESSING  4) Pt will present with centralization of pain from the distal L LE to the back to facilitate improved nerve intergrity and reduce symptoms -PROGRESSING

## 2024-12-12 ENCOUNTER — APPOINTMENT (OUTPATIENT)
Dept: PHYSICAL THERAPY | Facility: CLINIC | Age: 44
End: 2024-12-12
Payer: COMMERCIAL

## 2024-12-13 ENCOUNTER — APPOINTMENT (OUTPATIENT)
Dept: PHYSICAL THERAPY | Facility: CLINIC | Age: 44
End: 2024-12-13
Payer: COMMERCIAL

## 2024-12-19 DIAGNOSIS — M54.16 LUMBAR RADICULOPATHY: ICD-10-CM

## 2024-12-19 RX ORDER — KETOROLAC TROMETHAMINE 10 MG/1
10 TABLET, FILM COATED ORAL 3 TIMES DAILY PRN
Qty: 15 TABLET | Refills: 0 | Status: SHIPPED | OUTPATIENT
Start: 2024-12-19 | End: 2024-12-24

## 2024-12-20 ENCOUNTER — APPOINTMENT (OUTPATIENT)
Dept: PHYSICAL THERAPY | Facility: CLINIC | Age: 44
End: 2024-12-20
Payer: COMMERCIAL

## 2024-12-26 ENCOUNTER — APPOINTMENT (OUTPATIENT)
Dept: PHYSICAL THERAPY | Facility: CLINIC | Age: 44
End: 2024-12-26
Payer: COMMERCIAL

## 2024-12-30 ENCOUNTER — HOSPITAL ENCOUNTER (OUTPATIENT)
Facility: HOSPITAL | Age: 44
Discharge: HOME | End: 2024-12-30
Payer: COMMERCIAL

## 2024-12-30 ENCOUNTER — APPOINTMENT (OUTPATIENT)
Dept: PHYSICAL THERAPY | Facility: CLINIC | Age: 44
End: 2024-12-30
Payer: COMMERCIAL

## 2024-12-30 VITALS
WEIGHT: 293 LBS | HEIGHT: 67 IN | BODY MASS INDEX: 45.99 KG/M2 | TEMPERATURE: 98.8 F | RESPIRATION RATE: 17 BRPM | SYSTOLIC BLOOD PRESSURE: 155 MMHG | HEART RATE: 88 BPM | OXYGEN SATURATION: 99 % | DIASTOLIC BLOOD PRESSURE: 84 MMHG

## 2024-12-30 DIAGNOSIS — M54.16 LUMBAR RADICULOPATHY: ICD-10-CM

## 2024-12-30 PROCEDURE — 62323 NJX INTERLAMINAR LMBR/SAC: CPT | Performed by: ANESTHESIOLOGY

## 2024-12-30 PROCEDURE — 3700000012 HC SEDATION LEVEL 5+ TIME - INITIAL 15 MINUTES 5/> YEARS

## 2024-12-30 PROCEDURE — 2500000004 HC RX 250 GENERAL PHARMACY W/ HCPCS (ALT 636 FOR OP/ED): Performed by: ANESTHESIOLOGY

## 2024-12-30 PROCEDURE — 2550000001 HC RX 255 CONTRASTS: Performed by: ANESTHESIOLOGY

## 2024-12-30 RX ORDER — METHYLPREDNISOLONE ACETATE 40 MG/ML
INJECTION, SUSPENSION INTRA-ARTICULAR; INTRALESIONAL; INTRAMUSCULAR; SOFT TISSUE
Status: DISCONTINUED
Start: 2024-12-30 | End: 2024-12-31 | Stop reason: HOSPADM

## 2024-12-30 RX ORDER — LIDOCAINE HYDROCHLORIDE 5 MG/ML
INJECTION, SOLUTION INFILTRATION; INTRAVENOUS
Status: COMPLETED | OUTPATIENT
Start: 2024-12-30 | End: 2024-12-30

## 2024-12-30 RX ORDER — MIDAZOLAM HYDROCHLORIDE 1 MG/ML
INJECTION, SOLUTION INTRAMUSCULAR; INTRAVENOUS
Status: COMPLETED | OUTPATIENT
Start: 2024-12-30 | End: 2024-12-30

## 2024-12-30 RX ORDER — MIDAZOLAM HYDROCHLORIDE 1 MG/ML
INJECTION INTRAMUSCULAR; INTRAVENOUS
Status: DISCONTINUED
Start: 2024-12-30 | End: 2024-12-31 | Stop reason: HOSPADM

## 2024-12-30 RX ORDER — LIDOCAINE HYDROCHLORIDE 5 MG/ML
INJECTION, SOLUTION INFILTRATION; INTRAVENOUS
Status: DISCONTINUED
Start: 2024-12-30 | End: 2024-12-31 | Stop reason: HOSPADM

## 2024-12-30 RX ORDER — METHYLPREDNISOLONE ACETATE 40 MG/ML
INJECTION, SUSPENSION INTRA-ARTICULAR; INTRALESIONAL; INTRAMUSCULAR; SOFT TISSUE
Status: COMPLETED | OUTPATIENT
Start: 2024-12-30 | End: 2024-12-30

## 2024-12-30 RX ADMIN — IOHEXOL 3 ML: 240 INJECTION, SOLUTION INTRATHECAL; INTRAVASCULAR; INTRAVENOUS; ORAL at 13:08

## 2024-12-30 RX ADMIN — MIDAZOLAM 2 MG: 1 INJECTION INTRAMUSCULAR; INTRAVENOUS at 12:54

## 2024-12-30 RX ADMIN — METHYLPREDNISOLONE ACETATE 40 MG: 40 INJECTION, SUSPENSION INTRA-ARTICULAR; INTRALESIONAL; INTRAMUSCULAR; SOFT TISSUE at 13:08

## 2024-12-30 RX ADMIN — LIDOCAINE HYDROCHLORIDE 15 ML: 5 INJECTION, SOLUTION INFILTRATION at 13:08

## 2024-12-30 ASSESSMENT — COLUMBIA-SUICIDE SEVERITY RATING SCALE - C-SSRS
2. HAVE YOU ACTUALLY HAD ANY THOUGHTS OF KILLING YOURSELF?: NO
1. IN THE PAST MONTH, HAVE YOU WISHED YOU WERE DEAD OR WISHED YOU COULD GO TO SLEEP AND NOT WAKE UP?: NO
6. HAVE YOU EVER DONE ANYTHING, STARTED TO DO ANYTHING, OR PREPARED TO DO ANYTHING TO END YOUR LIFE?: NO

## 2024-12-30 ASSESSMENT — PAIN SCALES - GENERAL
PAINLEVEL_OUTOF10: 10 - WORST POSSIBLE PAIN
PAINLEVEL_OUTOF10: 5 - MODERATE PAIN
PAINLEVEL_OUTOF10: 8
PAINLEVEL_OUTOF10: 9
PAINLEVEL_OUTOF10: 5 - MODERATE PAIN
PAINLEVEL_OUTOF10: 8

## 2024-12-30 ASSESSMENT — PAIN - FUNCTIONAL ASSESSMENT
PAIN_FUNCTIONAL_ASSESSMENT: WONG-BAKER FACES
PAIN_FUNCTIONAL_ASSESSMENT: WONG-BAKER FACES
PAIN_FUNCTIONAL_ASSESSMENT: 0-10
PAIN_FUNCTIONAL_ASSESSMENT: 0-10
PAIN_FUNCTIONAL_ASSESSMENT: WONG-BAKER FACES
PAIN_FUNCTIONAL_ASSESSMENT: WONG-BAKER FACES

## 2024-12-30 ASSESSMENT — ENCOUNTER SYMPTOMS
LOSS OF SENSATION IN FEET: 1
OCCASIONAL FEELINGS OF UNSTEADINESS: 1

## 2024-12-30 NOTE — DISCHARGE INSTRUCTIONS
DISCHARGE INSTRUCTIONS FOR INJECTIONS     You underwent Lumbar interlaminar epidural steroid injection at the L4-5 level today    After most injections, it is recommended that you relax and limit your activity for the remainder of the day unless you have been told otherwise by your pain physician.  You should not drive a car, operate machinery, or make important legal decisions unless otherwise directed by your pain physician.  You may resume your normal activity, including exercise, tomorrow.      Keep a written pain diary of how much pain relief you experienced following the injection procedure and the length of time of pain relief you experienced pain relief. Following diagnostic injections like medial branch nerve blocks, sacroiliac joint blocks, stellate ganglion injections and other blocks, it is very important you record the specific amount of pain relief you experienced immediately after the injectionand how long it lasted. Your doctor will ask you for this information at your follow up visit.     For all injections, please keep the injection site dry and inspect the site for a couple of days. You may remove the Band-Aid the day of the injection at any time.     Some discomfort, bruising or slight swelling may occur at the injection site. This is not abnormal if it occurs.  If needed you may:    -Take over the counter medication such as Tylenol or Motrin.   -Apply an ice pack for 30 minutes, 2 to 3 times a day for the first 24 hours.     You may shower today; no soaking baths, hot tubs, whirlpools or swimming pools for two days.      If you are given steroids in your injection, it may take 3-5 days for the steroid medication to take effect. You may notice a worsening of your symptoms for 1-2 days after the injection. This is not abnormal.  You may use acetaminophen, ibuprofen, or prescription medication that your doctor may have prescribed for you if you need to do so.     A few common side effects of  steroids include facial flushing, sweating, restlessness, irritability,difficulty sleeping, increase in blood sugar, and increased blood pressure. If you have diabetes, please monitor your blood sugar at least once a day for at least 5 days. If you have poorly controlled high blood pressure, monitoryour blood pressure for at least 2 days and contact your primary care physician if these numbers are unusually high for you.      If you take aspirin or non-steroidal anti-inflammatory drugs (examples are Motrin, Advil, ibuprofen, Naprosyn, Voltaren, Relafen, etc.) you may restart these this evening, but stop taking it 3 days before your next appointment, unless instructed otherwiseby your physician.      You do not need to discontinue non-aspirin-containing pain medications prior to an injection (examples: Celebrex, tramadol, hydrocodone and acetaminophen).      If you take a blood thinning medication (Coumadin, Lovenox, Fragmin,Ticlid, Plavix, Pradaxa, etc.), please discuss this with your primary care physician/cardiologist and your pain physician. These medications MUST be discontinued before you can have an injection safely, without the risk of uncontrolled bleeding. If these medications are not discontinued for an appropriate period of time, you will not be able to receivean injection. Please adhere to instructions given to you about when to restart your blood thinning medication. If you have any questions please reach out to our team.    If you are taking Coumadin, please have your INR checked the morning of your procedure and bring the result to your appointment unless otherwise instructed. If your INR is over 1.2, your injection may need to be rescheduled to avoid uncontrolled bleeding from the needle placement.     Call [facility]  and ask for Pain Management at [phone] between 8am-4pm Monday - Friday if you are experiencing the following:    If you received an epidural or spinal injection:    -Headache that  doesnot go away with medicine, is worse when sitting or standing up, and is greatly relieved upon lying down.   -Severe pain worse than or different than your baseline pain.   -Chills or fever (101º F or greater).   -Drainage or signs of infection at the injection site     Go directly to the Emergency Department if you are experiencing the following and received an epidural or spinal injection:   -Abrupt weakness or progressive weakness in your legs that starts after you leave the clinic.   -Abrupt severe or worsening numbness in your legs.   -Inability to urinate after the injection or loss of bowel or bladder control without the urge to defecate or urinate.     If you have a clinical question that cannot wait until your next appointment, please call [phone] between 8am-4pm Monday - Friday or send a [employer] message. We do our best to return all non-emergency messages within 24 hours, Monday - Friday. A nurse or physician will return your message. You may also the appropriate nurse below, and they will do their best to answer your questions.  - For Dr. Esparza, call [facility] at [phone]  - For Dr. Ray, call Sara at [phone]  - For Dr. Borges, call Sara at [phone]  - For Dr. Cowan, call Summer at [phone]  - For Dr. Talley, call Summer at [phone]    If you need to cancel an appointment, please call the scheduling staff at [phone] during normal business hours or leave a message at least 24 hours in advance.     If you are going to be sedated for your next procedure, you MUST have responsible adult who can legally drive accompany you home. You cannot eat or drink for at least eight hours prior to the planned procedure if you are going to receive sedation. You may take your non-blood thinning medications with a small sip of water.

## 2024-12-30 NOTE — H&P
HISTORY AND PHYSICAL    History Of Present Illness  Amanda Beverly is a 44 y.o. female presenting with chronic pain here for procedure as stated below. Patient denies any changes to health since the last visit to our clinic.    Past Medical History  Past Medical History:   Diagnosis Date    Acute candidiasis of vulva and vagina 08/07/2019    Vaginal candidiasis    Acute vaginitis 03/01/2022    Bacterial vaginosis    Acute vaginitis 07/03/2018    Bacterial vaginosis    Acute vaginitis 08/07/2019    Bacterial vaginosis    Anemia, unspecified 08/10/2015    Low hemoglobin    Benign paroxysmal vertigo, unspecified ear 08/20/2014    Benign paroxysmal positional vertigo    Body mass index (BMI) 50.0-59.9, adult (Multi) 09/03/2021    Body mass index (BMI) of 50.0 to 59.9 in adult    Contact with and (suspected) exposure to covid-19 07/07/2020    Suspected COVID-19 virus infection    Dietary counseling and surveillance 05/21/2018    Dietary counseling    Dorsalgia, unspecified 04/05/2018    Acute back pain    Dysuria 03/08/2015    Dysuria    Elevated blood-pressure reading, without diagnosis of hypertension 10/07/2017    Elevated blood pressure reading    Encounter for care and examination of lactating mother 12/28/2015    Postpartum care and examination of lactating mother    Encounter for screening for human papillomavirus (HPV)     Screening for HPV (human papillomavirus)    Encounter for screening for lipoid disorders 04/21/2017    Screening for lipid disorders    Encounter for screening for malignant neoplasm of cervix 03/03/2015    Screening for cervical cancer    Exercise counseling 05/21/2018    Exercise counseling    Follicular disorder, unspecified 05/06/2020    Acute folliculitis    Long term (current) use of systemic steroids 01/29/2016    On prednisone therapy    Morbid (severe) obesity due to excess calories (Multi) 12/12/2019    Obesity, morbid, BMI 50 or higher    Morbid (severe) obesity due to excess  calories (Multi) 06/20/2019    Morbid obesity with BMI of 40.0-44.9, adult    Morbid (severe) obesity due to excess calories (Multi) 09/03/2021    Obesity, morbid, BMI 50 or higher    Nasal congestion     Head congestion    Other conditions influencing health status     Arthritis    Other conditions influencing health status     History of burning on urination    Other conditions influencing health status 04/05/2015    History of pregnancy    Other long term (current) drug therapy 09/26/2016    Encounter for long-term current use of high risk medication    Other specified diseases and conditions complicating pregnancy ([facility]) 04/28/2016    Systemic lupus complicating pregnancy    Other specified noninflammatory disorders of vagina     Vaginal irritation    Other specified postprocedural states     History of Papanicolaou smear    Other specified soft tissue disorders 02/08/2015    Right leg swelling    Other symptoms and signs involving the genitourinary system     Sensation of pressure in bladder area    Pain in unspecified ankle and joints of unspecified foot 05/03/2022    Ankle pain    Pain in unspecified foot 08/05/2016    Pains, foot    Pelvic and perineal pain 11/14/2019    Suprapubic pain    Pelvic and perineal pain 06/26/2018    Pelvic pain in female    Pelvic and perineal pain 07/23/2020    Pelvic pressure in female    Personal history of gestational diabetes 12/28/2015    History of gestational diabetes mellitus (GDM)    Personal history of other (healed) physical injury and trauma 09/03/2021    History of strain    Personal history of other diseases of the circulatory system 02/09/2018    History of hypertension    Personal history of other diseases of the digestive system 06/25/2015    History of constipation    Personal history of other diseases of the digestive system 02/18/2016    History of constipation    Personal history of other diseases of the female genital tract 03/19/2021    History of  vaginal discharge    Personal history of other diseases of the female genital tract 08/06/2019    History of vaginal discharge    Personal history of other diseases of the female genital tract 08/05/2016    History of vaginal discharge    Personal history of other diseases of the female genital tract 08/05/2021    History of vaginal pruritus    Personal history of other diseases of the musculoskeletal system and connective tissue 02/06/2020    History of low back pain    Personal history of other diseases of the musculoskeletal system and connective tissue 03/26/2016    History of joint pain    Personal history of other diseases of the respiratory system 08/22/2016    History of acute sinusitis    Personal history of other drug therapy 10/05/2017    History of influenza vaccination    Personal history of other drug therapy 09/25/2015    History of influenza vaccination    Personal history of other endocrine, nutritional and metabolic disease 01/28/2016    History of hyperglycemia    Personal history of other infectious and parasitic diseases 07/27/2020    History of trichomonal vaginitis    Personal history of other specified conditions 01/29/2016    History of edema    Personal history of other specified conditions 06/26/2018    History of incontinence of feces    Personal history of other specified conditions 08/06/2019    History of dysuria    Personal history of other specified conditions 02/18/2016    History of urinary frequency    Personal history of other specified conditions 06/20/2016    History of dysuria    Personal history of other specified conditions 08/17/2020    History of dysuria    Personal history of other specified conditions 01/07/2015    History of paresthesia    Personal history of other specified conditions 08/05/2021    History of abdominal pain    Personal history of other specified conditions 12/23/2021    History of nasal congestion    Personal history of other specified conditions      History of shortness of breath    Personal history of urinary (tract) infections 12/28/2016    History of urinary tract infection    Personal history of urinary (tract) infections 12/29/2016    History of urinary tract infection    Polyphagia 05/11/2021    Polyphagia    Proteinuria, unspecified 08/21/2020    Proteinuria    Radiculopathy, lumbar region 02/10/2020    Acute lumbar radiculopathy    Unspecified urinary incontinence 05/23/2018    Urinary incontinence in female    Urge incontinence 06/26/2018    Urge incontinence of urine    Urinary tract infection, site not specified 08/07/2019    Acute lower UTI    Urinary tract infection, site not specified 08/18/2020    Acute lower UTI       Surgical History  Past Surgical History:   Procedure Laterality Date    ECTOPIC PREGNANCY SURGERY  10/01/2013    Surgical Excision Of Tubal Pregnancy    OTHER SURGICAL HISTORY  10/01/2013    Abdominal Surgery    OTHER SURGICAL HISTORY  05/23/2018    Anal Sphincterectomy    OTHER SURGICAL HISTORY  08/20/2014    Laparoscopic Excision Of Ectopic Pregnancy And Salpingectomy    OTHER SURGICAL HISTORY  08/20/2014    Ovarian Cystectomy        Social History  She reports that she has never smoked. She has been exposed to tobacco smoke. She has never used smokeless tobacco. She reports that she does not currently use alcohol. She reports that she does not use drugs.    Family History  Family History   Problem Relation Name Age of Onset    Diabetes Mother      Other (Breast lump) Mother          benign    Hypotension Father      Diabetes Sister      Hypertension Brother          borderline        Allergies  House dust, Labetalol, and Pollen extracts    Review of Systems  12 point ROS done and negative except for the above.     Physical Exam  General: NAD, well groomed, well nourished  Eyes: Non-icteric sclera, EOMI  Ears, Nose, Mouth, and Throat: External ears and nose appear to be without deformity or rash. No lesions or masses noted.  Hearing is grossly intact.   Neck: Trachea midline  Respiratory: Nonlabored breathing   Cardiovascular: No peripheral edema   Skin: No rashes or open lesions/ulcers identified on skin.      Last Recorded Vitals  There were no vitals taken for this visit.    Relevant Results  Current Outpatient Medications   Medication Instructions    azaTHIOprine (IMURAN) 50 mg, oral, Daily    cetirizine HCl (ZYRTEC ORAL) 1 tablet, Daily PRN    cevimeline (EVOXAC) 30 mg, oral, 2 times daily    hydroxychloroquine (PLAQUENIL) 200 mg, oral, 2 times daily    lisinopril 20 mg, oral, Daily    methocarbamol (ROBAXIN) 500 mg, oral, 3 times daily PRN    sertraline (ZOLOFT) 50 mg, oral, Daily    topiramate (TOPAMAX) 100 mg, oral, 2 times daily         MR lumbar spine wo IV contrast 11/09/2024    Narrative  Interpreted By:  Domenico Martinez,  STUDY:  MR LUMBAR SPINE WO IV CONTRAST performed 11/9/2024 8:44 am    INDICATION:  Signs/Symptoms:followup last lumbar mri, concern for worsening neural  foraminal stenosis requiring epidural injections.    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    ACCESSION NUMBER(S):  [id number]    ORDERING CLINICIAN:  JUDITH WERNER    TECHNIQUE:  Multiplanar multisequence MR imaging of the lumbar spine performed  without intravenous contrast. Sagittal T1, T2, STIR, axial T1 and T2  weighted images of the lumbar spine were acquired.    FINDINGS:  Segmentation: Normal.    Conus: The lower thoracic cord appears unremarkable. The conus  terminates at the level of the superior endplate of L1. Cauda equina  are unremarkable.    Epidural fluid: None.    Alignment: Within normal limits.    Vertebral bodies: Vertebral body heights are  grossly maintained.    Marrow signal: Type 2 Modic endplate signal change at L4-L5. No focal  STIR hyperintensity/marrow edema within the lumbar region.    Intervertebral discs: Moderate degenerative disc height loss at  L4-L5, minimally worsened in the interim. Disc desiccation at this  level is again evident. Remaining lumbar intervertebral discs are  well preserved.      Degenerative change:    T12-L1: Unremarkable.    L1-2: Unremarkable.    L2-3: Mild ligamentum flavum thickening. No spinal canal stenosis or  significant neural foraminal narrowing.    L3-4: Mild bilateral facet arthropathy. Moderate ligamentum flavum  hypertrophy. No significant disc bulge. No spinal canal stenosis. No  substantial neural foraminal narrowing.    L4-5: Mild bilateral facet arthropathy. Slight ligamentum flavum  hypertrophy. Moderate disc bulge with endplate spurring and  superimposed left subarticular disc extrusion component. There is  mild cranial and caudal migration of disc components measuring up to  1.5 cm in craniocaudad extent (series 3, image 15), unchanged in size  and appearance. There is similar effacement of the left lateral  recess and compression of the descending left L5 nerve root. Mild  crowding of the right lateral recess is also unchanged. There is  otherwise mild spinal canal stenosis. Moderate right and severe left  neural foraminal narrowing with compression of the exiting left L4  nerve root, unchanged.    L5-S1: No spinal canal stenosis or neural foraminal narrowing.    Soft tissues: The prevertebral and posterior paraspinal soft tissues  are unremarkable.    Impression  Minimal interval increased disc height loss at L4-L5. Unchanged size  and extent of a moderate-sized left subarticular disc extrusion  effacing the left lateral recess and compressing the descending left  L5 nerve root. Additional disc bulge and endplate spurring results in  severe left/moderate right neural foraminal narrowing  with  compression of the exiting left L4 nerve root, also similar in  appearance.    No additional spinal canal stenosis or neural foraminal narrowing at  the remaining lumbar levels.    MACRO:  None    Signed by: Domenico Martinez 11/10/2024 10:03 AM  Dictation workstation:   [id number]     No image results found.     1. Lumbar radiculopathy  FL pain management    FL pain management    Epidural Steroid Injection    Epidural Steroid Injection              Assessment/Plan   Amanda Beverly is a 44 y.o. female presenting with chronic pain here for Lumbar interlaminar epidural steroid injection at the L4-5 level; she denies any recent antibiotic use or infections, she denies any blood thinner use , and she denies contrast or local anesthetic allergies.    ASA PS Classification: 3  Mallampati score: 2    Plan:  - We will proceed with the procedure as above. We discussed extensively the risks, benefits, and alternatives to the procedure. The patient's questions were addressed and answered in detail. The patient demonstrated understanding of the procedure, and is amenable to proceeding with it. The Risks of the procedure that were discussed with the patient include but are not limited to the following: A lack of efficacy, transient worsening of pain, bleeding, infection, nerve injury, nerve damage, neuritis or sunburn sensation. Informed consent obtained as attached to EMR.  - Patient to continue physician directed home exercises as tolerated.  - Patient will follow-up with us in clinic.      Dorota Mendoza MD  Chronic Pain Fellow  [facility]

## 2025-01-01 ENCOUNTER — TELEPHONE (OUTPATIENT)
Dept: INTERNAL MEDICINE | Facility: HOSPITAL | Age: 45
End: 2025-01-01
Payer: COMMERCIAL

## 2025-01-01 NOTE — TELEPHONE ENCOUNTER
Telephone Encounter Summar:  Patient called chronic pain pager ~8:30am today, called patient back at [phone] and confirmed identity with 2 identifiers (name and ). Amanda Beverly had a L4-5 interlaminar epidural injection with Dr. Esparza on Monday.  In the past in  she had transforaminal epidural steroid injections that she reports provided excellent relief pretty shortly after the injection.  She reports that she does not have any pain relief after her recent interlaminar epidural injection.  She reports that she is having worsening pain in her left lower extremity that is making it difficult for her to stand or walk on that leg.  She denies any new weakness however reports that the pain is so bad when she puts any sort of pressure on it that she has only been getting up out of bed to go to the bathroom.  She reports that she feels like she is dragging her leg.  She continues to take her Topamax as prescribed.  She is not taking any other pain medications.  She denies any fevers chills, headaches, nausea or vomiting.  She also denies bowel or bladder incontinence or saddle anesthesia.  Denies any falls.  I discussed with the patient that in addition to her Topamax 100 mg that she takes twice per day she can take 1000 mg of Tylenol every 8 hours for a maximum of 3000 mg of Tylenol I will in a 24-hour period.  Also discussed with the patient that it has only been 3 days since she had the epidural injection and that a can take up to a week or 10 days sometimes to see maximal/peak effect from the steroids.  The patient has also seen orthopedic spine in the past and we discussed that if epidural injections are potentially not working for her she may have to see a surgeon again. I discussed with the patient that we can try to set her up with a follow-up appointment even a virtual visit with Dr. Esparza as soon as possible.  We will reach out to the patient tomorrow to discuss appointment.  I discussed with  the patient that if she has any red flag symptoms such as bowel or bladder incontinence, saddle anesthesia, fevers, chills, any significant motor weakness or unbearable pain that she should present to the emergency department.  She reported that she understood this and is in agreement with the plan.    Juan Jose Saldaña MD  [id number]  Interventional Pain Fellow

## 2025-01-02 ENCOUNTER — APPOINTMENT (OUTPATIENT)
Dept: PHYSICAL THERAPY | Facility: CLINIC | Age: 45
End: 2025-01-02
Payer: COMMERCIAL

## 2025-01-03 DIAGNOSIS — M54.16 LUMBAR RADICULOPATHY: ICD-10-CM

## 2025-01-03 RX ORDER — GABAPENTIN 300 MG/1
CAPSULE ORAL
Qty: 180 CAPSULE | Refills: 0 | Status: SHIPPED | OUTPATIENT
Start: 2025-01-03 | End: 2025-01-06

## 2025-01-06 ENCOUNTER — APPOINTMENT (OUTPATIENT)
Dept: ORTHOPEDIC SURGERY | Facility: CLINIC | Age: 45
End: 2025-01-06
Payer: COMMERCIAL

## 2025-01-07 ENCOUNTER — TELEMEDICINE (OUTPATIENT)
Dept: PAIN MEDICINE | Facility: HOSPITAL | Age: 45
End: 2025-01-07
Payer: COMMERCIAL

## 2025-01-07 DIAGNOSIS — M54.16 LUMBAR RADICULOPATHY: ICD-10-CM

## 2025-01-07 PROCEDURE — 99214 OFFICE O/P EST MOD 30 MIN: CPT | Performed by: ANESTHESIOLOGY

## 2025-01-07 NOTE — PROGRESS NOTES
Chief complaint: Back pain and leg pain    HPI   Amanda Beverly is a 44-year-old female with a history of back and intractable left leg pain.  The patient has a known disc herniation at L4-5 which is impinging more to the left.  The patient has had a prior epidural which was done on 12/30/2024 which she says in combination with gabapentin has significantly improved her pain though she cannot specifically state a percent relief that she obtained.  The patient used to go to work in person but has been unable to secondary to not being able to stand or drive.    She is wondering what next steps could be considered.  She has tried anti-inflammatories, steroids, gabapentin, methocarbamol, topiramate.  She has even been in the emergency room for this specific issue.    ROS: 13 point review of systems is complete and is negative listed above in HPI    Past Medical History:   Diagnosis Date    Acute candidiasis of vulva and vagina 08/07/2019    Vaginal candidiasis    Acute vaginitis 03/01/2022    Bacterial vaginosis    Acute vaginitis 07/03/2018    Bacterial vaginosis    Acute vaginitis 08/07/2019    Bacterial vaginosis    Anemia, unspecified 08/10/2015    Low hemoglobin    Benign paroxysmal vertigo, unspecified ear 08/20/2014    Benign paroxysmal positional vertigo    Body mass index (BMI) 50.0-59.9, adult (Multi) 09/03/2021    Body mass index (BMI) of 50.0 to 59.9 in adult    Contact with and (suspected) exposure to covid-19 07/07/2020    Suspected COVID-19 virus infection    Dietary counseling and surveillance 05/21/2018    Dietary counseling    Dorsalgia, unspecified 04/05/2018    Acute back pain    Dysuria 03/08/2015    Dysuria    Elevated blood-pressure reading, without diagnosis of hypertension 10/07/2017    Elevated blood pressure reading    Encounter for care and examination of lactating mother 12/28/2015    Postpartum care and examination of lactating mother    Encounter for screening for human papillomavirus (HPV)      Screening for HPV (human papillomavirus)    Encounter for screening for lipoid disorders 04/21/2017    Screening for lipid disorders    Encounter for screening for malignant neoplasm of cervix 03/03/2015    Screening for cervical cancer    Exercise counseling 05/21/2018    Exercise counseling    Follicular disorder, unspecified 05/06/2020    Acute folliculitis    Long term (current) use of systemic steroids 01/29/2016    On prednisone therapy    Morbid (severe) obesity due to excess calories (Multi) 12/12/2019    Obesity, morbid, BMI 50 or higher    Morbid (severe) obesity due to excess calories (Multi) 06/20/2019    Morbid obesity with BMI of 40.0-44.9, adult    Morbid (severe) obesity due to excess calories (Multi) 09/03/2021    Obesity, morbid, BMI 50 or higher    Nasal congestion     Head congestion    Other conditions influencing health status     Arthritis    Other conditions influencing health status     History of burning on urination    Other conditions influencing health status 04/05/2015    History of pregnancy    Other long term (current) drug therapy 09/26/2016    Encounter for long-term current use of high risk medication    Other specified diseases and conditions complicating pregnancy ([facility]) 04/28/2016    Systemic lupus complicating pregnancy    Other specified noninflammatory disorders of vagina     Vaginal irritation    Other specified postprocedural states     History of Papanicolaou smear    Other specified soft tissue disorders 02/08/2015    Right leg swelling    Other symptoms and signs involving the genitourinary system     Sensation of pressure in bladder area    Pain in unspecified ankle and joints of unspecified foot 05/03/2022    Ankle pain    Pain in unspecified foot 08/05/2016    Pains, foot    Pelvic and perineal pain 11/14/2019    Suprapubic pain    Pelvic and perineal pain 06/26/2018    Pelvic pain in female    Pelvic and perineal pain 07/23/2020    Pelvic pressure in female     Personal history of gestational diabetes 12/28/2015    History of gestational diabetes mellitus (GDM)    Personal history of other (healed) physical injury and trauma 09/03/2021    History of strain    Personal history of other diseases of the circulatory system 02/09/2018    History of hypertension    Personal history of other diseases of the digestive system 06/25/2015    History of constipation    Personal history of other diseases of the digestive system 02/18/2016    History of constipation    Personal history of other diseases of the female genital tract 03/19/2021    History of vaginal discharge    Personal history of other diseases of the female genital tract 08/06/2019    History of vaginal discharge    Personal history of other diseases of the female genital tract 08/05/2016    History of vaginal discharge    Personal history of other diseases of the female genital tract 08/05/2021    History of vaginal pruritus    Personal history of other diseases of the musculoskeletal system and connective tissue 02/06/2020    History of low back pain    Personal history of other diseases of the musculoskeletal system and connective tissue 03/26/2016    History of joint pain    Personal history of other diseases of the respiratory system 08/22/2016    History of acute sinusitis    Personal history of other drug therapy 10/05/2017    History of influenza vaccination    Personal history of other drug therapy 09/25/2015    History of influenza vaccination    Personal history of other endocrine, nutritional and metabolic disease 01/28/2016    History of hyperglycemia    Personal history of other infectious and parasitic diseases 07/27/2020    History of trichomonal vaginitis    Personal history of other specified conditions 01/29/2016    History of edema    Personal history of other specified conditions 06/26/2018    History of incontinence of feces    Personal history of other specified conditions 08/06/2019    History of  dysuria    Personal history of other specified conditions 02/18/2016    History of urinary frequency    Personal history of other specified conditions 06/20/2016    History of dysuria    Personal history of other specified conditions 08/17/2020    History of dysuria    Personal history of other specified conditions 01/07/2015    History of paresthesia    Personal history of other specified conditions 08/05/2021    History of abdominal pain    Personal history of other specified conditions 12/23/2021    History of nasal congestion    Personal history of other specified conditions     History of shortness of breath    Personal history of urinary (tract) infections 12/28/2016    History of urinary tract infection    Personal history of urinary (tract) infections 12/29/2016    History of urinary tract infection    Polyphagia 05/11/2021    Polyphagia    Proteinuria, unspecified 08/21/2020    Proteinuria    Radiculopathy, lumbar region 02/10/2020    Acute lumbar radiculopathy    Unspecified urinary incontinence 05/23/2018    Urinary incontinence in female    Urge incontinence 06/26/2018    Urge incontinence of urine    Urinary tract infection, site not specified 08/07/2019    Acute lower UTI    Urinary tract infection, site not specified 08/18/2020    Acute lower UTI       Past Surgical History:   Procedure Laterality Date    ECTOPIC PREGNANCY SURGERY  10/01/2013    Surgical Excision Of Tubal Pregnancy    OTHER SURGICAL HISTORY  10/01/2013    Abdominal Surgery    OTHER SURGICAL HISTORY  05/23/2018    Anal Sphincterectomy    OTHER SURGICAL HISTORY  08/20/2014    Laparoscopic Excision Of Ectopic Pregnancy And Salpingectomy    OTHER SURGICAL HISTORY  08/20/2014    Ovarian Cystectomy       Family History   Problem Relation Name Age of Onset    Diabetes Mother      Other (Breast lump) Mother          benign    Hypotension Father      Diabetes Sister      Hypertension Brother          borderline       Social History     Tobacco  Use    Smoking status: Never     Passive exposure: Current    Smokeless tobacco: Never   Vaping Use    Vaping status: Never Used   Substance Use Topics    Alcohol use: Not Currently    Drug use: Never       Current Outpatient Medications on File Prior to Visit   Medication Sig Dispense Refill    azaTHIOprine (Imuran) 50 mg tablet Take 1 tablet (50 mg) by mouth once daily. 90 tablet 3    cetirizine HCl (ZYRTEC ORAL) Take 1 tablet by mouth once daily as needed.      cevimeline (Evoxac) 30 mg capsule Take 1 capsule (30 mg) by mouth 2 times a day. 180 capsule 3    gabapentin (Neurontin) 300 mg capsule Take 1 capsule (300 mg) by mouth once daily at bedtime for 1 day, THEN 1 capsule (300 mg) 2 times a day for 1 day, THEN 1 capsule (300 mg) 3 times a day for 1 day. Thereafter, increase by one capsule every third day until taking two capsules three times a day. 180 capsule 0    hydroxychloroquine (Plaquenil) 200 mg tablet Take 1 tablet (200 mg) by mouth 2 times a day. 180 tablet 3    lisinopril 20 mg tablet Take 1 tablet (20 mg) by mouth once daily. 90 tablet 1    methocarbamol (Robaxin) 500 mg tablet Take 1 tablet (500 mg) by mouth 3 times a day as needed for muscle spasms. 40 tablet 1    sertraline (Zoloft) 50 mg tablet Take 1 tablet (50 mg) by mouth once daily. 90 tablet 1    topiramate (Topamax) 100 mg tablet Take 1 tablet (100 mg) by mouth 2 times a day. 180 tablet 0     No current facility-administered medications on file prior to visit.        Allergies   Allergen Reactions    House Dust Unknown    Labetalol Other     Muscles in legs tightened     Pollen Extracts Unknown          Imaging:  Narrative & Impression   Interpreted By:  Domenico Martinez,   STUDY:  MR LUMBAR SPINE WO IV CONTRAST performed 11/9/2024 8:44 am      INDICATION:  Signs/Symptoms:followup last lumbar mri, concern for worsening neural  foraminal stenosis requiring epidural injections.      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      ACCESSION NUMBER(S):  [id number]      ORDERING CLINICIAN:  JUDITH WERNER      TECHNIQUE:  Multiplanar multisequence MR imaging of the lumbar spine performed  without intravenous contrast. Sagittal T1, T2, STIR, axial T1 and T2  weighted images of the lumbar spine were acquired.      FINDINGS:  Segmentation: Normal.      Conus: The lower thoracic cord appears unremarkable. The conus  terminates at the level of the superior endplate of L1. Cauda equina  are unremarkable.      Epidural fluid: None.      Alignment: Within normal limits.      Vertebral bodies: Vertebral body heights are grossly maintained.      Marrow signal: Type 2 Modic endplate signal change at L4-L5. No focal  STIR hyperintensity/marrow edema within the lumbar region.      Intervertebral discs: Moderate degenerative disc height loss at  L4-L5, minimally worsened in the interim. Disc desiccation at this  level is again evident. Remaining lumbar intervertebral discs are  well preserved.          Degenerative change:      T12-L1: Unremarkable.      L1-2: Unremarkable.      L2-3: Mild ligamentum flavum thickening. No spinal canal stenosis or  significant neural foraminal narrowing.      L3-4: Mild bilateral facet arthropathy. Moderate ligamentum flavum  hypertrophy. No significant disc bulge. No spinal canal stenosis. No  substantial neural foraminal narrowing.      L4-5: Mild bilateral facet arthropathy. Slight ligamentum flavum  hypertrophy. Moderate disc bulge with endplate spurring and  superimposed left subarticular disc extrusion component. There is  mild cranial and caudal migration of disc components measuring up to  1.5 cm in craniocaudad extent (series 3, image  15), unchanged in size  and appearance. There is similar effacement of the left lateral  recess and compression of the descending left L5 nerve root. Mild  crowding of the right lateral recess is also unchanged. There is  otherwise mild spinal canal stenosis. Moderate right and severe left  neural foraminal narrowing with compression of the exiting left L4  nerve root, unchanged.      L5-S1: No spinal canal stenosis or neural foraminal narrowing.      Soft tissues: The prevertebral and posterior paraspinal soft tissues  are unremarkable.      IMPRESSION:  Minimal interval increased disc height loss at L4-L5. Unchanged size  and extent of a moderate-sized left subarticular disc extrusion  effacing the left lateral recess and compressing the descending left  L5 nerve root. Additional disc bulge and endplate spurring results in  severe left/moderate right neural foraminal narrowing with  compression of the exiting left L4 nerve root, also similar in  appearance.      No additional spinal canal stenosis or neural foraminal narrowing at  the remaining lumbar levels.      MACRO:  None     Physical Exam:  Gen.: No acute distress  Psychiatric:  Patient is alert and oriented x3, appropriate mood    Impression/Plan:   is a 44-year-old female with a history of low back and intractable left leg pain.  The patient has known degenerative changes and a left sided disc extrusion at the L4-5 level in the spine based on an MRI that she had.  She has been in the emergency room, tried a number of conservative measures and is following up to discuss next steps in treatment.  She is not a surgical candidate due to her BMI of almost 55.    Would consider a more targeted procedure called a left-sided transforaminal, targeting the L4 and possibly 5 level.  Procedure, risk, benefits, and alternatives reviewed.    Encouraged to keep up with physical therapy as able.  Encouraged core strengthening and weight reduction.  To be a surgical  candidate the patient would have to have a BMI of under 40, for elective spine surgery.    Would uptitrate gabapentin.  Side effect profile and risk reviewed.  Would plan for a target dose of 600 mg 3 times a day.  Written instructions given explained, if we stop this later would need to wean it down slowly rather than stop it abruptly.    Patient will be given a letter to continue working at home for the time being as she has a difficult time getting to and from work secondary to her pain.

## 2025-01-08 ENCOUNTER — APPOINTMENT (OUTPATIENT)
Dept: ORTHOPEDIC SURGERY | Facility: CLINIC | Age: 45
End: 2025-01-08
Payer: COMMERCIAL

## 2025-01-09 ENCOUNTER — OFFICE VISIT (OUTPATIENT)
Dept: ORTHOPEDIC SURGERY | Facility: CLINIC | Age: 45
End: 2025-01-09
Payer: COMMERCIAL

## 2025-01-09 DIAGNOSIS — M54.16 LUMBAR RADICULOPATHY: Primary | ICD-10-CM

## 2025-01-09 PROCEDURE — 1036F TOBACCO NON-USER: CPT | Performed by: PHYSICIAN ASSISTANT

## 2025-01-09 PROCEDURE — 99212 OFFICE O/P EST SF 10 MIN: CPT | Performed by: PHYSICIAN ASSISTANT

## 2025-01-09 ASSESSMENT — PAIN - FUNCTIONAL ASSESSMENT: PAIN_FUNCTIONAL_ASSESSMENT: NO/DENIES PAIN

## 2025-01-09 NOTE — PROGRESS NOTES
returns today for a follow up.    She met with PMR 12/27 and had an injection done that provided min relief. She dev pretty significant foot numbness afterwards and she called PMR and they placed her on gabapentin 600mg TID. The medication is helping. She did call PMR back for another injection and was given an apt for Jan 23.    She was made aware I am leaving  and she will follow PRN with Dr Jiménez if sxs return or worsen     I reviewed the complete 30-point review of systems that was documented on the scanned patient intake form.  All other systems are non-contributory except as defined in history of present illness.    This note was dictated using speech recognition software and was not corrected for spelling or grammatical errors

## 2025-01-26 NOTE — H&P
Pain Management H&P    History Of Present Illness  Amanda Beverly is a 44 y.o. female presents for procedure state below. Endorses no changes in past medical history or medical health since last seen in clinic.      Past Medical History  She has a past medical history of Acute candidiasis of vulva and vagina (08/07/2019), Acute vaginitis (03/01/2022), Acute vaginitis (07/03/2018), Acute vaginitis (08/07/2019), Anemia, unspecified (08/10/2015), Benign paroxysmal vertigo, unspecified ear (08/20/2014), Body mass index (BMI) 50.0-59.9, adult (Multi) (09/03/2021), Contact with and (suspected) exposure to covid-19 (07/07/2020), Dietary counseling and surveillance (05/21/2018), Dorsalgia, unspecified (04/05/2018), Dysuria (03/08/2015), Elevated blood-pressure reading, without diagnosis of hypertension (10/07/2017), Encounter for care and examination of lactating mother (12/28/2015), Encounter for screening for human papillomavirus (HPV), Encounter for screening for lipoid disorders (04/21/2017), Encounter for screening for malignant neoplasm of cervix (03/03/2015), Exercise counseling (05/21/2018), Follicular disorder, unspecified (05/06/2020), Long term (current) use of systemic steroids (01/29/2016), Morbid (severe) obesity due to excess calories (Multi) (12/12/2019), Morbid (severe) obesity due to excess calories (Multi) (06/20/2019), Morbid (severe) obesity due to excess calories (Multi) (09/03/2021), Nasal congestion, Other conditions influencing health status, Other conditions influencing health status, Other conditions influencing health status (04/05/2015), Other long term (current) drug therapy (09/26/2016), Other specified diseases and conditions complicating pregnancy ([facility]) (04/28/2016), Other specified noninflammatory disorders of vagina, Other specified postprocedural states, Other specified soft tissue disorders (02/08/2015), Other symptoms and signs involving the genitourinary system, Pain in unspecified  ankle and joints of unspecified foot (05/03/2022), Pain in unspecified foot (08/05/2016), Pelvic and perineal pain (11/14/2019), Pelvic and perineal pain (06/26/2018), Pelvic and perineal pain (07/23/2020), Personal history of gestational diabetes (12/28/2015), Personal history of other (healed) physical injury and trauma (09/03/2021), Personal history of other diseases of the circulatory system (02/09/2018), Personal history of other diseases of the digestive system (06/25/2015), Personal history of other diseases of the digestive system (02/18/2016), Personal history of other diseases of the female genital tract (03/19/2021), Personal history of other diseases of the female genital tract (08/06/2019), Personal history of other diseases of the female genital tract (08/05/2016), Personal history of other diseases of the female genital tract (08/05/2021), Personal history of other diseases of the musculoskeletal system and connective tissue (02/06/2020), Personal history of other diseases of the musculoskeletal system and connective tissue (03/26/2016), Personal history of other diseases of the respiratory system (08/22/2016), Personal history of other drug therapy (10/05/2017), Personal history of other drug therapy (09/25/2015), Personal history of other endocrine, nutritional and metabolic disease (01/28/2016), Personal history of other infectious and parasitic diseases (07/27/2020), Personal history of other specified conditions (01/29/2016), Personal history of other specified conditions (06/26/2018), Personal history of other specified conditions (08/06/2019), Personal history of other specified conditions (02/18/2016), Personal history of other specified conditions (06/20/2016), Personal history of other specified conditions (08/17/2020), Personal history of other specified conditions (01/07/2015), Personal history of other specified conditions (08/05/2021), Personal history of other specified conditions  (12/23/2021), Personal history of other specified conditions, Personal history of urinary (tract) infections (12/28/2016), Personal history of urinary (tract) infections (12/29/2016), Polyphagia (05/11/2021), Proteinuria, unspecified (08/21/2020), Radiculopathy, lumbar region (02/10/2020), Unspecified urinary incontinence (05/23/2018), Urge incontinence (06/26/2018), Urinary tract infection, site not specified (08/07/2019), and Urinary tract infection, site not specified (08/18/2020).    Surgical History  She has a past surgical history that includes Other surgical history (10/01/2013); Ectopic pregnancy surgery (10/01/2013); Other surgical history (05/23/2018); Other surgical history (08/20/2014); and Other surgical history (08/20/2014).     Social History  She reports that she has never smoked. She has been exposed to tobacco smoke. She has never used smokeless tobacco. She reports that she does not currently use alcohol. She reports that she does not use drugs.    Family History  Family History   Problem Relation Name Age of Onset    Diabetes Mother      Other (Breast lump) Mother          benign    Hypotension Father      Diabetes Sister      Hypertension Brother          borderline        Allergies  House dust, Labetalol, and Pollen extracts    Review of Symptoms:   Constitutional: Negative for chills, diaphoresis or fever  HENT: Negative for neck swelling  Eyes:.  Negative for eye pain  Respiratory:.  Negative for cough, shortness of breath or wheezing    Cardiovascular:.  Negative for chest pain or palpitations  Gastrointestinal:.  Negative for abdominal pain, nausea and vomiting  Genitourinary:.  Negative for urgency  Musculoskeletal:  Positive for back pain. Positive for joint pain. Denies falls within the past 3 months.  Skin: Negative for wounds or itching   Neurological: Negative for dizziness, seizures, loss of consciousness and weakness  Endo/Heme/Allergies: Does not bruise/bleed  easily  Psychiatric/Behavioral: Negative for depression. The patient does not appear anxious.      Pre-sedation Evaluation  ASA class 3  Mallampati score 3     PHYSICAL EXAM  Vitals signs reviewed  Constitutional:       General: Not in acute distress     Appearance: Normal appearance. Not ill-appearing.  HENT:     Head: Normocephalic and atraumatic  Eyes:     Conjunctiva/sclera: Conjunctivae normal  Cardiovascular:     Rate and Rhythm: Normal rate and regular rhythm  Pulmonary:     Effort: No respiratory distress  Abdominal:     Palpations: Abdomen is soft  Musculoskeletal: KENDALL  Skin:     General: Skin is warm and dry  Neurological:     General: No focal deficit present  Psychiatric:         Mood and Affect: Mood normal         Behavior: Behavior normal     Last Recorded Vitals  There were no vitals taken for this visit.    Relevant Results  Current Outpatient Medications   Medication Instructions    azaTHIOprine (IMURAN) 50 mg, oral, Daily    cetirizine HCl (ZYRTEC ORAL) 1 tablet, Daily PRN    cevimeline (EVOXAC) 30 mg, oral, 2 times daily    gabapentin (Neurontin) 300 mg capsule Take 1 capsule (300 mg) by mouth once daily at bedtime for 1 day, THEN 1 capsule (300 mg) 2 times a day for 1 day, THEN 1 capsule (300 mg) 3 times a day for 1 day. Thereafter, increase by one capsule every third day until taking two capsules three times a day.    hydroxychloroquine (PLAQUENIL) 200 mg, oral, 2 times daily    lisinopril 20 mg, oral, Daily    methocarbamol (ROBAXIN) 500 mg, oral, 3 times daily PRN    sertraline (ZOLOFT) 50 mg, oral, Daily    topiramate (TOPAMAX) 100 mg, oral, 2 times daily         MR lumbar spine wo IV contrast 11/09/2024    Narrative  Interpreted By:  Domenico Martinez,  STUDY:  MR LUMBAR SPINE WO IV CONTRAST performed 11/9/2024 8:44 am    INDICATION:  Signs/Symptoms:followup last lumbar mri, concern for worsening neural  foraminal stenosis requiring epidural injections.    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    ACCESSION NUMBER(S):  [id number]    ORDERING CLINICIAN:  JUDITH WERNER    TECHNIQUE:  Multiplanar multisequence MR imaging of the lumbar spine performed  without intravenous contrast. Sagittal T1, T2, STIR, axial T1 and T2  weighted images of the lumbar spine were acquired.    FINDINGS:  Segmentation: Normal.    Conus: The lower thoracic cord appears unremarkable. The conus  terminates at the level of the superior endplate of L1. Cauda equina  are unremarkable.    Epidural fluid: None.    Alignment: Within normal limits.    Vertebral bodies: Vertebral body heights are grossly maintained.    Marrow signal: Type 2 Modic endplate signal change at L4-L5. No focal  STIR hyperintensity/marrow edema within the lumbar region.    Intervertebral discs: Moderate degenerative disc height loss at  L4-L5, minimally worsened in the interim. Disc desiccation at this  level is again evident. Remaining lumbar intervertebral discs are  well preserved.      Degenerative change:    T12-L1: Unremarkable.    L1-2: Unremarkable.    L2-3: Mild ligamentum flavum thickening. No spinal canal stenosis or  significant neural foraminal narrowing.    L3-4: Mild bilateral facet arthropathy. Moderate ligamentum flavum  hypertrophy. No significant disc bulge. No spinal canal stenosis. No  substantial neural foraminal narrowing.    L4-5: Mild bilateral facet arthropathy. Slight ligamentum flavum  hypertrophy. Moderate disc bulge with endplate spurring and  superimposed left subarticular disc extrusion component. There is  mild cranial and caudal migration of disc components measuring up to  1.5 cm in craniocaudad extent (series 3, image 15), unchanged  in size  and appearance. There is similar effacement of the left lateral  recess and compression of the descending left L5 nerve root. Mild  crowding of the right lateral recess is also unchanged. There is  otherwise mild spinal canal stenosis. Moderate right and severe left  neural foraminal narrowing with compression of the exiting left L4  nerve root, unchanged.    L5-S1: No spinal canal stenosis or neural foraminal narrowing.    Soft tissues: The prevertebral and posterior paraspinal soft tissues  are unremarkable.    Impression  Minimal interval increased disc height loss at L4-L5. Unchanged size  and extent of a moderate-sized left subarticular disc extrusion  effacing the left lateral recess and compressing the descending left  L5 nerve root. Additional disc bulge and endplate spurring results in  severe left/moderate right neural foraminal narrowing with  compression of the exiting left L4 nerve root, also similar in  appearance.    No additional spinal canal stenosis or neural foraminal narrowing at  the remaining lumbar levels.    MACRO:  None    Signed by: Domenico Martinez 11/10/2024 10:03 AM  Dictation workstation:   [id number]     Epidural Steroid Injection  Table formatting from the original result was not included.  Procedure  Epidural Steroid Injection    Indication  Lumbar radiculopathy    Medications  lidocaine PF (Xylocaine) 5 mg/mL (0.5 %) injection 15 mL   methylPREDNISolone acetate (DEPO-Medrol) injection 40 mg   midazolam (Versed) injection 2 mg   iohexol (OMNIPaque) 240 mg iodine/mL solution 3 mL   (Totals for administrations occurring from 1252 to 1309 on 12/30/24)     Preprocedure  A history and physical has been performed, and patient medication   allergies have been reviewed. The patient's tolerance of previous   anesthesia has been reviewed. The risks and benefits of the procedure and   the sedation options and risks were discussed with the patient. All   questions were answered and  informed consent obtained.    Details of the Procedure  Preoperative diagnosis:  Lumbar radiculitis  Postoperative diagnosis:  Lumbar radiculitis, lumbar disc herniation  Procedure: L4-5 epidural steroid injection under fluoroscopic guidance  Surgeon: Patricia Esparza  Assistant:  Fellow, Kelly  Anesthesia: Local, IV sedation  Complications: Apparently none    Clinical note:   is a 44-year-old female with a history of low back   and left leg pain who presents today for the aforementioned procedure.    Procedure note: The patient was met in the preoperative holding area after   risks benefits and alternatives to procedure were discussed with the   patient, informed consent was obtained. Patient brought back to the   procedure room and placed in the prone position on the fluoroscopy table.    She was able to comfortably positioned herself in the semilateral   position.  The area over the back was exposed, prepped, draped, in the   usual sterile fashion.  Skin and subcutaneous tissues to the lumbar   intralaminar space at L4-5 was anesthetized using 0.5% lidocaine.  An   17-gauge 4.5 inch Tuohy needle was inserted in the skin and advanced into   the interlaminar space.  We did have to reposition slightly inferiorly to   appropriately entered the interspace.  A glass syringe was used to achieve   the epidural space using the loss resistance technique.  As we could not   achieve a lateral view AP and contralateral oblique views were utilized.    Contrast was injected which showed appropriate epidural spread, no   intravascular or intrathecal uptake. A total of 4 mL's of 0.5% lidocaine   mixed with 40 mg methylprednisolone was injected. Needle removed, bandage   applied, patient tolerated the procedure well with no immediate   complications.    Procedure Provider  Patricia Esparza MD    Procedure Location  [facility]  [address]  [address]  [phone]    Referring Provider  Patricia Esparza MD  [id number]  Department Of Anesthesiology And Perioperative [place] [address]       No diagnosis found.     ASSESSMENT/PLAN  Amanda Beverly is a 44 y.o. female here for left sided L4, L5 TFESI    Patient denies any recent antibiotic use or infections, denies any blood thinner use, and denies contrast or local anesthetic allergies     Risks, benefits, alternatives discussed. All questions answered to the best of my ability. Patient agrees to proceed.      Our plan is as follows:  - Proceed with aforementioned procedure          Samir Brooks DO   Pain fellow

## 2025-01-27 ENCOUNTER — HOSPITAL ENCOUNTER (OUTPATIENT)
Facility: HOSPITAL | Age: 45
Discharge: HOME | End: 2025-01-27
Payer: COMMERCIAL

## 2025-01-27 VITALS
DIASTOLIC BLOOD PRESSURE: 86 MMHG | WEIGHT: 293 LBS | SYSTOLIC BLOOD PRESSURE: 136 MMHG | TEMPERATURE: 97.7 F | RESPIRATION RATE: 16 BRPM | HEIGHT: 67 IN | BODY MASS INDEX: 45.99 KG/M2 | HEART RATE: 77 BPM | OXYGEN SATURATION: 100 %

## 2025-01-27 DIAGNOSIS — M54.16 LUMBAR RADICULOPATHY: ICD-10-CM

## 2025-01-27 PROCEDURE — 64483 NJX AA&/STRD TFRM EPI L/S 1: CPT | Performed by: ANESTHESIOLOGY

## 2025-01-27 PROCEDURE — 2500000004 HC RX 250 GENERAL PHARMACY W/ HCPCS (ALT 636 FOR OP/ED): Performed by: ANESTHESIOLOGY

## 2025-01-27 PROCEDURE — 2720000007 HC OR 272 NO HCPCS

## 2025-01-27 PROCEDURE — 64483 NJX AA&/STRD TFRM EPI L/S 1: CPT | Mod: LT | Performed by: ANESTHESIOLOGY

## 2025-01-27 PROCEDURE — 2550000001 HC RX 255 CONTRASTS: Performed by: ANESTHESIOLOGY

## 2025-01-27 RX ORDER — LIDOCAINE HYDROCHLORIDE 5 MG/ML
INJECTION, SOLUTION INFILTRATION; INTRAVENOUS
Status: DISPENSED
Start: 2025-01-27 | End: 2025-01-27

## 2025-01-27 RX ORDER — LIDOCAINE HYDROCHLORIDE 5 MG/ML
INJECTION, SOLUTION INFILTRATION; INTRAVENOUS
Status: COMPLETED | OUTPATIENT
Start: 2025-01-27 | End: 2025-01-27

## 2025-01-27 RX ORDER — MIDAZOLAM HYDROCHLORIDE 1 MG/ML
INJECTION, SOLUTION INTRAMUSCULAR; INTRAVENOUS
Status: COMPLETED | OUTPATIENT
Start: 2025-01-27 | End: 2025-01-27

## 2025-01-27 RX ORDER — DEXAMETHASONE SODIUM PHOSPHATE 10 MG/ML
INJECTION INTRAMUSCULAR; INTRAVENOUS
Status: COMPLETED | OUTPATIENT
Start: 2025-01-27 | End: 2025-01-27

## 2025-01-27 RX ORDER — MIDAZOLAM HYDROCHLORIDE 1 MG/ML
INJECTION INTRAMUSCULAR; INTRAVENOUS
Status: DISPENSED
Start: 2025-01-27 | End: 2025-01-27

## 2025-01-27 RX ORDER — DEXAMETHASONE SODIUM PHOSPHATE 10 MG/ML
INJECTION INTRAMUSCULAR; INTRAVENOUS
Status: DISPENSED
Start: 2025-01-27 | End: 2025-01-27

## 2025-01-27 RX ADMIN — MIDAZOLAM 4 MG: 1 INJECTION INTRAMUSCULAR; INTRAVENOUS at 10:03

## 2025-01-27 RX ADMIN — LIDOCAINE HYDROCHLORIDE 7 ML: 5 INJECTION, SOLUTION INFILTRATION at 10:04

## 2025-01-27 RX ADMIN — IOHEXOL 1 ML: 240 INJECTION, SOLUTION INTRATHECAL; INTRAVASCULAR; INTRAVENOUS; ORAL at 10:06

## 2025-01-27 RX ADMIN — DEXAMETHASONE SODIUM PHOSPHATE 10 MG: 10 INJECTION, SOLUTION INTRAMUSCULAR; INTRAVENOUS at 10:07

## 2025-01-27 ASSESSMENT — PAIN SCALES - GENERAL
PAINLEVEL_OUTOF10: 3
PAINLEVEL_OUTOF10: 6

## 2025-01-27 ASSESSMENT — PAIN - FUNCTIONAL ASSESSMENT
PAIN_FUNCTIONAL_ASSESSMENT: 0-10
PAIN_FUNCTIONAL_ASSESSMENT: 0-10
PAIN_FUNCTIONAL_ASSESSMENT: WONG-BAKER FACES
PAIN_FUNCTIONAL_ASSESSMENT: 0-10

## 2025-01-27 ASSESSMENT — COLUMBIA-SUICIDE SEVERITY RATING SCALE - C-SSRS
2. HAVE YOU ACTUALLY HAD ANY THOUGHTS OF KILLING YOURSELF?: NO
6. HAVE YOU EVER DONE ANYTHING, STARTED TO DO ANYTHING, OR PREPARED TO DO ANYTHING TO END YOUR LIFE?: NO
1. IN THE PAST MONTH, HAVE YOU WISHED YOU WERE DEAD OR WISHED YOU COULD GO TO SLEEP AND NOT WAKE UP?: NO

## 2025-01-27 NOTE — DISCHARGE INSTRUCTIONS
DISCHARGE INSTRUCTIONS FOR INJECTIONS     You underwent left lumbar transforaminal epidural injection today    After most injections, it is recommended that you relax and limit your activity for the remainder of the day unless you have been told otherwise by your pain physician.  You should not drive a car, operate machinery, or make important legal decisions unless otherwise directed by your pain physician.  You may resume your normal activity, including exercise, tomorrow.      Keep a written pain diary of how much pain relief you experienced following the injection procedure and the length of time of pain relief you experienced pain relief. Following diagnostic injections like medial branch nerve blocks, sacroiliac joint blocks, stellate ganglion injections and other blocks, it is very important you record the specific amount of pain relief you experienced immediately after the injectionand how long it lasted. Your doctor will ask you for this information at your follow up visit.     For all injections, please keep the injection site dry and inspect the site for a couple of days. You may remove the Band-Aid the day of the injection at any time.     Some discomfort, bruising or slight swelling may occur at the injection site. This is not abnormal if it occurs.  If needed you may:    -Take over the counter medication such as Tylenol or Motrin.   -Apply an ice pack for 30 minutes, 2 to 3 times a day for the first 24 hours.     You may shower today; no soaking baths, hot tubs, whirlpools or swimming pools for two days.      If you are given steroids in your injection, it may take 3-5 days for the steroid medication to take effect. You may notice a worsening of your symptoms for 1-2 days after the injection. This is not abnormal.  You may use acetaminophen, ibuprofen, or prescription medication that your doctor may have prescribed for you if you need to do so.     A few common side effects of steroids include facial  flushing, sweating, restlessness, irritability,difficulty sleeping, increase in blood sugar, and increased blood pressure. If you have diabetes, please monitor your blood sugar at least once a day for at least 5 days. If you have poorly controlled high blood pressure, monitoryour blood pressure for at least 2 days and contact your primary care physician if these numbers are unusually high for you.      If you take aspirin or non-steroidal anti-inflammatory drugs (examples are Motrin, Advil, ibuprofen, Naprosyn, Voltaren, Relafen, etc.) you may restart these this evening, but stop taking it 3 days before your next appointment, unless instructed otherwiseby your physician.      You do not need to discontinue non-aspirin-containing pain medications prior to an injection (examples: Celebrex, tramadol, hydrocodone and acetaminophen).      If you take a blood thinning medication (Coumadin, Lovenox, Fragmin,Ticlid, Plavix, Pradaxa, etc.), please discuss this with your primary care physician/cardiologist and your pain physician. These medications MUST be discontinued before you can have an injection safely, without the risk of uncontrolled bleeding. If these medications are not discontinued for an appropriate period of time, you will not be able to receivean injection. Please adhere to instructions given to you about when to restart your blood thinning medication. If you have any questions please reach out to our team.    If you are taking Coumadin, please have your INR checked the morning of your procedure and bring the result to your appointment unless otherwise instructed. If your INR is over 1.2, your injection may need to be rescheduled to avoid uncontrolled bleeding from the needle placement.     Call [facility]  and ask for Pain Management at [phone] between 8am-4pm Monday - Friday if you are experiencing the following:    If you received an epidural or spinal injection:    -Headache that doesnot go away with  medicine, is worse when sitting or standing up, and is greatly relieved upon lying down.   -Severe pain worse than or different than your baseline pain.   -Chills or fever (101º F or greater).   -Drainage or signs of infection at the injection site     Go directly to the Emergency Department if you are experiencing the following and received an epidural or spinal injection:   -Abrupt weakness or progressive weakness in your legs that starts after you leave the clinic.   -Abrupt severe or worsening numbness in your legs.   -Inability to urinate after the injection or loss of bowel or bladder control without the urge to defecate or urinate.     If you have a clinical question that cannot wait until your next appointment, please call [phone] between 8am-4pm Monday - Friday or send a [employer] message. We do our best to return all non-emergency messages within 24 hours, Monday - Friday. A nurse or physician will return your message. You may also try calling Dr. Isaias Ward's nurse ([phone]) and they will do their best to answer your question(s).    If you need to cancel an appointment, please call the scheduling staff at [phone] during normal business hours or leave a message at least 24 hours in advance.     If you are going to be sedated for your next procedure, you MUST have responsible adult who can legally drive accompany you home. You cannot eat or drink for at least eight hours prior to the planned procedure if you are going to receive sedation. You may take your non-blood thinning medications with a small sip of water.

## 2025-02-10 ENCOUNTER — OFFICE VISIT (OUTPATIENT)
Dept: ORTHOPEDIC SURGERY | Facility: CLINIC | Age: 45
End: 2025-02-10
Payer: COMMERCIAL

## 2025-02-10 ENCOUNTER — HOSPITAL ENCOUNTER (OUTPATIENT)
Dept: RADIOLOGY | Facility: CLINIC | Age: 45
Discharge: HOME | End: 2025-02-10
Payer: COMMERCIAL

## 2025-02-10 ENCOUNTER — APPOINTMENT (OUTPATIENT)
Dept: ORTHOPEDIC SURGERY | Facility: CLINIC | Age: 45
End: 2025-02-10
Payer: COMMERCIAL

## 2025-02-10 DIAGNOSIS — M51.16 LUMBAR DISC DISEASE WITH RADICULOPATHY: Primary | ICD-10-CM

## 2025-02-10 DIAGNOSIS — E66.813 CLASS 3 SEVERE OBESITY WITH BODY MASS INDEX (BMI) OF 50.0 TO 59.9 IN ADULT, UNSPECIFIED OBESITY TYPE, UNSPECIFIED WHETHER SERIOUS COMORBIDITY PRESENT: ICD-10-CM

## 2025-02-10 DIAGNOSIS — E66.01 CLASS 3 SEVERE OBESITY WITH BODY MASS INDEX (BMI) OF 50.0 TO 59.9 IN ADULT, UNSPECIFIED OBESITY TYPE, UNSPECIFIED WHETHER SERIOUS COMORBIDITY PRESENT: ICD-10-CM

## 2025-02-10 DIAGNOSIS — M54.50 LUMBAR PAIN: ICD-10-CM

## 2025-02-10 PROCEDURE — 72110 X-RAY EXAM L-2 SPINE 4/>VWS: CPT

## 2025-02-10 PROCEDURE — 99214 OFFICE O/P EST MOD 30 MIN: CPT | Performed by: ORTHOPAEDIC SURGERY

## 2025-02-10 PROCEDURE — 72110 X-RAY EXAM L-2 SPINE 4/>VWS: CPT | Performed by: RADIOLOGY

## 2025-02-10 NOTE — PROGRESS NOTES
Chief Complaint: Left great toe numbness and weakness    HPI: Amanda Beverly is a 44 y.o. year old female patient who was previously seeing our PA Zhang for low back pain and left foot pain and numbness.  She has a history of chronic low back pain but was seen in emergency room at the end of last year for worsening low back pain and left radiculopathy and then follow-up with Zhang.  She had MRI and CT scan which shows degenerative changes specifically at L4-L5 with degenerative disc and the calcified protruding disc causing stenosis.  She underwent steroid injection with pain management.  She had to the last 1 was January 27, 2025 which helped with some of her leg symptoms.  But she still has numbness and weakness in her left great toe.  Her back pain seems to be manageable.  No history of spinal surgery denies any bowel or bladder disturbances.  She is currently working on weight loss on Topamax.      Review of Systems    All other systems have been reviewed and are negative for complaint. All pertinent positive and negative as listed in history of present illness.    Past Medical History:   Diagnosis Date    Acute candidiasis of vulva and vagina 08/07/2019    Vaginal candidiasis    Acute vaginitis 03/01/2022    Bacterial vaginosis    Acute vaginitis 07/03/2018    Bacterial vaginosis    Acute vaginitis 08/07/2019    Bacterial vaginosis    Anemia, unspecified 08/10/2015    Low hemoglobin    Benign paroxysmal vertigo, unspecified ear 08/20/2014    Benign paroxysmal positional vertigo    Body mass index (BMI) 50.0-59.9, adult (Multi) 09/03/2021    Body mass index (BMI) of 50.0 to 59.9 in adult    Contact with and (suspected) exposure to covid-19 07/07/2020    Suspected COVID-19 virus infection    Dietary counseling and surveillance 05/21/2018    Dietary counseling    Dorsalgia, unspecified 04/05/2018    Acute back pain    Dysuria 03/08/2015    Dysuria    Elevated blood-pressure reading, without diagnosis of  hypertension 10/07/2017    Elevated blood pressure reading    Encounter for care and examination of lactating mother 12/28/2015    Postpartum care and examination of lactating mother    Encounter for screening for human papillomavirus (HPV)     Screening for HPV (human papillomavirus)    Encounter for screening for lipoid disorders 04/21/2017    Screening for lipid disorders    Encounter for screening for malignant neoplasm of cervix 03/03/2015    Screening for cervical cancer    Exercise counseling 05/21/2018    Exercise counseling    Follicular disorder, unspecified 05/06/2020    Acute folliculitis    Long term (current) use of systemic steroids 01/29/2016    On prednisone therapy    Morbid (severe) obesity due to excess calories (Multi) 12/12/2019    Obesity, morbid, BMI 50 or higher    Morbid (severe) obesity due to excess calories (Multi) 06/20/2019    Morbid obesity with BMI of 40.0-44.9, adult    Morbid (severe) obesity due to excess calories (Multi) 09/03/2021    Obesity, morbid, BMI 50 or higher    Nasal congestion     Head congestion    Other conditions influencing health status     Arthritis    Other conditions influencing health status     History of burning on urination    Other conditions influencing health status 04/05/2015    History of pregnancy    Other long term (current) drug therapy 09/26/2016    Encounter for long-term current use of high risk medication    Other specified diseases and conditions complicating pregnancy ([facility]) 04/28/2016    Systemic lupus complicating pregnancy    Other specified noninflammatory disorders of vagina     Vaginal irritation    Other specified postprocedural states     History of Papanicolaou smear    Other specified soft tissue disorders 02/08/2015    Right leg swelling    Other symptoms and signs involving the genitourinary system     Sensation of pressure in bladder area    Pain in unspecified ankle and joints of unspecified foot 05/03/2022    Ankle pain     Pain in unspecified foot 08/05/2016    Pains, foot    Pelvic and perineal pain 11/14/2019    Suprapubic pain    Pelvic and perineal pain 06/26/2018    Pelvic pain in female    Pelvic and perineal pain 07/23/2020    Pelvic pressure in female    Personal history of gestational diabetes 12/28/2015    History of gestational diabetes mellitus (GDM)    Personal history of other (healed) physical injury and trauma 09/03/2021    History of strain    Personal history of other diseases of the circulatory system 02/09/2018    History of hypertension    Personal history of other diseases of the digestive system 06/25/2015    History of constipation    Personal history of other diseases of the digestive system 02/18/2016    History of constipation    Personal history of other diseases of the female genital tract 03/19/2021    History of vaginal discharge    Personal history of other diseases of the female genital tract 08/06/2019    History of vaginal discharge    Personal history of other diseases of the female genital tract 08/05/2016    History of vaginal discharge    Personal history of other diseases of the female genital tract 08/05/2021    History of vaginal pruritus    Personal history of other diseases of the musculoskeletal system and connective tissue 02/06/2020    History of low back pain    Personal history of other diseases of the musculoskeletal system and connective tissue 03/26/2016    History of joint pain    Personal history of other diseases of the respiratory system 08/22/2016    History of acute sinusitis    Personal history of other drug therapy 10/05/2017    History of influenza vaccination    Personal history of other drug therapy 09/25/2015    History of influenza vaccination    Personal history of other endocrine, nutritional and metabolic disease 01/28/2016    History of hyperglycemia    Personal history of other infectious and parasitic diseases 07/27/2020    History of trichomonal vaginitis     Personal history of other specified conditions 01/29/2016    History of edema    Personal history of other specified conditions 06/26/2018    History of incontinence of feces    Personal history of other specified conditions 08/06/2019    History of dysuria    Personal history of other specified conditions 02/18/2016    History of urinary frequency    Personal history of other specified conditions 06/20/2016    History of dysuria    Personal history of other specified conditions 08/17/2020    History of dysuria    Personal history of other specified conditions 01/07/2015    History of paresthesia    Personal history of other specified conditions 08/05/2021    History of abdominal pain    Personal history of other specified conditions 12/23/2021    History of nasal congestion    Personal history of other specified conditions     History of shortness of breath    Personal history of urinary (tract) infections 12/28/2016    History of urinary tract infection    Personal history of urinary (tract) infections 12/29/2016    History of urinary tract infection    Polyphagia 05/11/2021    Polyphagia    Proteinuria, unspecified 08/21/2020    Proteinuria    Radiculopathy, lumbar region 02/10/2020    Acute lumbar radiculopathy    Unspecified urinary incontinence 05/23/2018    Urinary incontinence in female    Urge incontinence 06/26/2018    Urge incontinence of urine    Urinary tract infection, site not specified 08/07/2019    Acute lower UTI    Urinary tract infection, site not specified 08/18/2020    Acute lower UTI        Past Surgical History:   Procedure Laterality Date    ECTOPIC PREGNANCY SURGERY  10/01/2013    Surgical Excision Of Tubal Pregnancy    OTHER SURGICAL HISTORY  10/01/2013    Abdominal Surgery    OTHER SURGICAL HISTORY  05/23/2018    Anal Sphincterectomy    OTHER SURGICAL HISTORY  08/20/2014    Laparoscopic Excision Of Ectopic Pregnancy And Salpingectomy    OTHER SURGICAL HISTORY  08/20/2014    Ovarian  Cystectomy        Allergies   Allergen Reactions    House Dust Unknown    Labetalol Other     Muscles in legs tightened     Pollen Extracts Unknown        Current Outpatient Medications on File Prior to Visit   Medication Sig Dispense Refill    azaTHIOprine (Imuran) 50 mg tablet Take 1 tablet (50 mg) by mouth once daily. 90 tablet 3    cetirizine HCl (ZYRTEC ORAL) Take 1 tablet by mouth once daily as needed.      cevimeline (Evoxac) 30 mg capsule Take 1 capsule (30 mg) by mouth 2 times a day. 180 capsule 3    gabapentin (Neurontin) 300 mg capsule Take 1 capsule (300 mg) by mouth once daily at bedtime for 1 day, THEN 1 capsule (300 mg) 2 times a day for 1 day, THEN 1 capsule (300 mg) 3 times a day for 1 day. Thereafter, increase by one capsule every third day until taking two capsules three times a day. 180 capsule 0    hydroxychloroquine (Plaquenil) 200 mg tablet Take 1 tablet (200 mg) by mouth 2 times a day. 180 tablet 3    lisinopril 20 mg tablet Take 1 tablet (20 mg) by mouth once daily. 90 tablet 1    methocarbamol (Robaxin) 500 mg tablet Take 1 tablet (500 mg) by mouth 3 times a day as needed for muscle spasms. 40 tablet 1    sertraline (Zoloft) 50 mg tablet Take 1 tablet (50 mg) by mouth once daily. 90 tablet 1    topiramate (Topamax) 100 mg tablet Take 1 tablet (100 mg) by mouth 2 times a day. 180 tablet 0     No current facility-administered medications on file prior to visit.          PE:   General: Patient appears well-nourished and well-developed in no acute distress, Alert and Oriented x3 morbidly obese female  Psych: Pleasant mood and affect  HEENT: Extraocular muscles intact, pupils equal and round. Sclerae anicteric   Cardio: extremities warm and well perfused  Resp: unlabored symmetric breathing  Skin: no open wounds or rash  Musculoskeletal/Neuro Exam: Normal gait.  No tenderness to palpation along the lumbar spine.   Lower extremity    Motor: Right leg with 5 out of 5 motor strength with hip  flexion, knee extension, ankle dorsiflexion plantarflexion and EHL against resistance.  Left leg with 5 out of 5 motor strength with hip flexion, knee extension, 4 out of 5 ankle dorsiflexion 5 out of 5 ankle plantarflexion 1 out of 5 EHL against resistance  Sensation to light touch intact along L2 to S1 distribution bilaterally except for diminished sensation over the dorsal aspect of the left great toe and foot more so on the medial aspect of the dorsal surface of the foot          Imaging:  I personally reviewed xray of the lumbar spine obtained in clinic today. AP, Lateral, flexion and extension xrays were reviewed. No evidence of acute fracture or dislocation.  Degenerative changes to space narrowing and endplate sclerosis at L4-L5    I reviewed the MRI of the lumbar spine from November 9, 2024.  MRI shows multilevel degenerative changes but specifically at L4 there is endplate sclerosis with Modic changes.  There is disc protrusion more so towards the left side causing lateral recess stenosis and compression of the transversing nerve root with severe left foraminal narrowing.    She also has a CT abdomen pelvis from October 17, 2024 which shows calcification around that L4-5 disc            A/P: Amanda Beverly is a 44 y.o. year old female patient with left foot weakness with weakness with left EHL and ankle dorsiflexion with numbness and paresthesia along the L4-L5 dermatomal distribution.  Which is consistent with MRI finding with the calcified disc.  I reviewed the images with her in clinic today.  I discussed conservative versus operative management.  Conservative including physical therapy to work on strengthening of the lower extremity as well as epidural steroid injection if it continues to provide relief as well as medical management.  Surgery would likely involve decompression of the L4-L5 however given her body habitus she is a poor surgical candidate due to high risk of complications such as  infection and wound healing complications.  If she were to pursue surgery I would need her to lose a significant amount of weight and get her BMI below 40.    After our discussion patient elected to pursue physical therapy which we gave her referral for today and she was also interested in nutrition consult which we gave her referral for that as well.  She can otherwise follow-up as needed    After our discussion, the patient articulated understanding of the plan and felt that all questions had been answered satisfactorily. The patient was pleased with the visit and very appreciative for the care rendered.    **Please excuse any errors in grammar or translation related to this dictation. Voice recognition software was utilized to prepare this document. **              Priscilla Leonard MD    Department of Orthopaedic Surgery  [facility]  [email or web address]

## 2025-02-19 DIAGNOSIS — M54.16 LUMBAR RADICULOPATHY: ICD-10-CM

## 2025-02-20 RX ORDER — GABAPENTIN 600 MG/1
600 TABLET ORAL 3 TIMES DAILY
Qty: 90 TABLET | Refills: 5 | Status: SHIPPED | OUTPATIENT
Start: 2025-02-20 | End: 2026-02-20

## 2025-02-24 ENCOUNTER — DOCUMENTATION (OUTPATIENT)
Dept: PHYSICAL THERAPY | Facility: CLINIC | Age: 45
End: 2025-02-24
Payer: COMMERCIAL

## 2025-02-24 NOTE — PROGRESS NOTES
Physical Therapy    Discharge Summary    Name: Amanda Beverly  MRN: [id number]  : 1980  Date: 25    Discharge Summary: PT    Discharge Information: Date of discharge 25    Rehab Discharge Reason: Achieved all and/or the most significant goals(s)

## 2025-02-27 ENCOUNTER — HOSPITAL ENCOUNTER (OUTPATIENT)
Dept: RADIOLOGY | Facility: CLINIC | Age: 45
End: 2025-02-27
Payer: COMMERCIAL

## 2025-03-29 DIAGNOSIS — M25.552 BILATERAL HIP PAIN: ICD-10-CM

## 2025-03-29 DIAGNOSIS — G62.9 NEUROPATHY: ICD-10-CM

## 2025-03-29 DIAGNOSIS — M46.1 SACROILIITIS (CMS-HCC): ICD-10-CM

## 2025-03-29 DIAGNOSIS — M25.551 BILATERAL HIP PAIN: ICD-10-CM

## 2025-03-30 DIAGNOSIS — M25.552 BILATERAL HIP PAIN: ICD-10-CM

## 2025-03-30 DIAGNOSIS — G62.9 NEUROPATHY: ICD-10-CM

## 2025-03-30 DIAGNOSIS — M46.1 SACROILIITIS (CMS-HCC): ICD-10-CM

## 2025-03-30 DIAGNOSIS — M25.551 BILATERAL HIP PAIN: ICD-10-CM

## 2025-03-30 RX ORDER — TOPIRAMATE 100 MG/1
100 TABLET, FILM COATED ORAL 2 TIMES DAILY
Qty: 180 TABLET | Refills: 0 | OUTPATIENT
Start: 2025-03-30

## 2025-03-31 RX ORDER — TOPIRAMATE 100 MG/1
100 TABLET, FILM COATED ORAL 2 TIMES DAILY
Qty: 180 TABLET | Refills: 0 | OUTPATIENT
Start: 2025-03-31

## 2025-03-31 NOTE — TELEPHONE ENCOUNTER
Was unable to reach the patient thru phone number provided. A message was sent to pt thru my chart asking them to call the office and schedule an appointment with Dr. Arellano so she can refill pt prescriptions

## 2025-05-06 DIAGNOSIS — N04.9 NEPHROTIC SYNDROME: ICD-10-CM

## 2025-05-07 DIAGNOSIS — M32.9 SYSTEMIC LUPUS ERYTHEMATOSUS, UNSPECIFIED SLE TYPE, UNSPECIFIED ORGAN INVOLVEMENT STATUS (MULTI): ICD-10-CM

## 2025-05-07 DIAGNOSIS — M35.05 SJOGREN'S SYNDROME WITH INFLAMMATORY ARTHRITIS: ICD-10-CM

## 2025-05-07 RX ORDER — AZATHIOPRINE 50 MG/1
50 TABLET ORAL DAILY
Qty: 90 TABLET | Refills: 3 | OUTPATIENT
Start: 2025-05-07

## 2025-05-07 RX ORDER — LISINOPRIL 20 MG/1
20 TABLET ORAL DAILY
Qty: 90 TABLET | Refills: 0 | OUTPATIENT
Start: 2025-05-07

## 2025-05-07 NOTE — TELEPHONE ENCOUNTER
Prescription Request    Azathioprine 50mg    Has The Patient Been Identified By Name And Date Of Birth:    RX Requestor: Pharmacy    Date of Last Refill: 10/28/24    Date Of Last Office Visit: 10/24    Date Of Future Office Visit: ---    [facility]    Left VM for patient to return call to schedule follow up appt.

## 2025-05-09 ENCOUNTER — PATIENT OUTREACH (OUTPATIENT)
Dept: CARE COORDINATION | Facility: CLINIC | Age: 45
End: 2025-05-09
Payer: COMMERCIAL

## 2025-05-12 ENCOUNTER — TELEPHONE (OUTPATIENT)
Dept: PRIMARY CARE | Facility: CLINIC | Age: 45
End: 2025-05-12
Payer: COMMERCIAL

## 2025-05-12 DIAGNOSIS — N04.9 NEPHROTIC SYNDROME: ICD-10-CM

## 2025-05-12 RX ORDER — LISINOPRIL 20 MG/1
20 TABLET ORAL DAILY
Qty: 90 TABLET | Refills: 1 | Status: SHIPPED | OUTPATIENT
Start: 2025-05-12 | End: 2025-11-08

## 2025-05-13 DIAGNOSIS — M35.05 SJOGREN'S SYNDROME WITH INFLAMMATORY ARTHRITIS: ICD-10-CM

## 2025-05-13 DIAGNOSIS — M32.9 SYSTEMIC LUPUS ERYTHEMATOSUS, UNSPECIFIED SLE TYPE, UNSPECIFIED ORGAN INVOLVEMENT STATUS (MULTI): ICD-10-CM

## 2025-05-13 RX ORDER — AZATHIOPRINE 50 MG/1
50 TABLET ORAL DAILY
Qty: 30 TABLET | Refills: 0 | OUTPATIENT
Start: 2025-05-13

## 2025-05-15 ENCOUNTER — APPOINTMENT (OUTPATIENT)
Dept: RHEUMATOLOGY | Facility: CLINIC | Age: 45
End: 2025-05-15
Payer: COMMERCIAL

## 2025-05-15 ENCOUNTER — APPOINTMENT (OUTPATIENT)
Dept: CARE COORDINATION | Facility: CLINIC | Age: 45
End: 2025-05-15
Payer: COMMERCIAL

## 2025-05-15 DIAGNOSIS — E66.813 CLASS 3 SEVERE OBESITY WITH BODY MASS INDEX (BMI) OF 50.0 TO 59.9 IN ADULT, UNSPECIFIED OBESITY TYPE, UNSPECIFIED WHETHER SERIOUS COMORBIDITY PRESENT: ICD-10-CM

## 2025-05-15 DIAGNOSIS — M54.50 LUMBAR PAIN: ICD-10-CM

## 2025-05-20 NOTE — PROGRESS NOTES
Virtual appt was scheduled for 5/15, however pt signed on and signed off abruptly. Attempted to contact pt multiple times via phone, however attempts were unsuccessful. Voicemail was left with contact info for return call back.

## 2025-06-08 DIAGNOSIS — M25.551 BILATERAL HIP PAIN: ICD-10-CM

## 2025-06-08 DIAGNOSIS — G62.9 NEUROPATHY: ICD-10-CM

## 2025-06-08 DIAGNOSIS — M25.552 BILATERAL HIP PAIN: ICD-10-CM

## 2025-06-08 DIAGNOSIS — M46.1 SACROILIITIS: ICD-10-CM

## 2025-06-09 RX ORDER — TOPIRAMATE 100 MG/1
100 TABLET, FILM COATED ORAL 2 TIMES DAILY
Qty: 180 TABLET | Refills: 0 | Status: SHIPPED | OUTPATIENT
Start: 2025-06-09

## 2025-06-10 DIAGNOSIS — M32.9 SYSTEMIC LUPUS ERYTHEMATOSUS, UNSPECIFIED SLE TYPE, UNSPECIFIED ORGAN INVOLVEMENT STATUS (MULTI): ICD-10-CM

## 2025-06-10 DIAGNOSIS — M35.05 SJOGREN'S SYNDROME WITH INFLAMMATORY ARTHRITIS: ICD-10-CM

## 2025-06-10 DIAGNOSIS — M48.061 NEURAL FORAMINAL STENOSIS OF LUMBAR SPINE: ICD-10-CM

## 2025-06-10 DIAGNOSIS — M32.9 SYSTEMIC LUPUS ERYTHEMATOSUS, UNSPECIFIED SLE TYPE, UNSPECIFIED ORGAN INVOLVEMENT STATUS (MULTI): Primary | ICD-10-CM

## 2025-06-10 DIAGNOSIS — E66.01 MORBID OBESITY WITH BMI OF 50.0-59.9, ADULT (MULTI): ICD-10-CM

## 2025-06-10 DIAGNOSIS — M87.00 AVASCULAR NECROSIS (MULTI): ICD-10-CM

## 2025-06-10 DIAGNOSIS — M35.00 SJOGREN'S SYNDROME, WITH UNSPECIFIED ORGAN INVOLVEMENT (MULTI): ICD-10-CM

## 2025-06-10 RX ORDER — AZATHIOPRINE 50 MG/1
50 TABLET ORAL DAILY
Qty: 90 TABLET | Refills: 3 | OUTPATIENT
Start: 2025-06-10

## 2025-06-10 NOTE — TELEPHONE ENCOUNTER
Patient called in requesting a refill of Azathioprine 50mg.  Also C/O swelling in both ankles.  Using [facility].    Prescription Request        Has The Patient Been Identified By Name And Date Of Birth:    RX Requestor: Patient     Date of Last Refill: 10/28/24    Date Of Last Office Visit:     Date Of Future Office Visit: 7/17/25

## 2025-07-04 DIAGNOSIS — M35.00 SJOGREN'S SYNDROME, WITH UNSPECIFIED ORGAN INVOLVEMENT (MULTI): ICD-10-CM

## 2025-07-04 DIAGNOSIS — M48.061 NEURAL FORAMINAL STENOSIS OF LUMBAR SPINE: ICD-10-CM

## 2025-07-04 DIAGNOSIS — M87.00 AVASCULAR NECROSIS (MULTI): ICD-10-CM

## 2025-07-04 DIAGNOSIS — M35.05 SJOGREN'S SYNDROME WITH INFLAMMATORY ARTHRITIS: ICD-10-CM

## 2025-07-04 DIAGNOSIS — M32.9 SYSTEMIC LUPUS ERYTHEMATOSUS, UNSPECIFIED SLE TYPE, UNSPECIFIED ORGAN INVOLVEMENT STATUS (MULTI): ICD-10-CM

## 2025-07-07 DIAGNOSIS — M35.00 SJOGREN'S SYNDROME, WITH UNSPECIFIED ORGAN INVOLVEMENT (MULTI): ICD-10-CM

## 2025-07-07 DIAGNOSIS — M87.00 AVASCULAR NECROSIS (MULTI): ICD-10-CM

## 2025-07-07 DIAGNOSIS — M35.05 SJOGREN'S SYNDROME WITH INFLAMMATORY ARTHRITIS: ICD-10-CM

## 2025-07-07 DIAGNOSIS — M32.9 SYSTEMIC LUPUS ERYTHEMATOSUS, UNSPECIFIED SLE TYPE, UNSPECIFIED ORGAN INVOLVEMENT STATUS (MULTI): ICD-10-CM

## 2025-07-07 DIAGNOSIS — M48.061 NEURAL FORAMINAL STENOSIS OF LUMBAR SPINE: ICD-10-CM

## 2025-07-07 RX ORDER — HYDROXYCHLOROQUINE SULFATE 200 MG/1
TABLET, FILM COATED ORAL 2 TIMES DAILY
Qty: 12 TABLET | Refills: 0 | OUTPATIENT
Start: 2025-07-07

## 2025-07-07 RX ORDER — HYDROXYCHLOROQUINE SULFATE 200 MG/1
TABLET, FILM COATED ORAL 2 TIMES DAILY
Qty: 180 TABLET | Refills: 0 | Status: SHIPPED | OUTPATIENT
Start: 2025-07-07

## 2025-07-07 NOTE — TELEPHONE ENCOUNTER
Prescription Request for Hydroxychloroquine        Has The Patient Been Identified By Name And Date Of Birth: yes    RX Requestor: pharmacy    Date of Last Refill: 10/28/24    Date Of Last Office Visit: 10/28/24    Date Of Future Office Visit: 7/17/25

## 2025-07-17 ENCOUNTER — APPOINTMENT (OUTPATIENT)
Dept: RHEUMATOLOGY | Facility: CLINIC | Age: 45
End: 2025-07-17
Payer: COMMERCIAL

## 2025-07-17 LAB
ALBUMIN SERPL-MCNC: 4.3 G/DL (ref 3.6–5.1)
ALP SERPL-CCNC: 50 U/L (ref 31–125)
ALT SERPL-CCNC: 10 U/L (ref 6–29)
ANION GAP SERPL CALCULATED.4IONS-SCNC: 7 MMOL/L (CALC) (ref 7–17)
APPEARANCE UR: CLEAR
AST SERPL-CCNC: 14 U/L (ref 10–35)
BACTERIA #/AREA URNS HPF: ABNORMAL /HPF
BASOPHILS # BLD AUTO: 10 CELLS/UL (ref 0–200)
BASOPHILS NFR BLD AUTO: 0.2 %
BILIRUB SERPL-MCNC: 0.5 MG/DL (ref 0.2–1.2)
BILIRUB UR QL STRIP: NEGATIVE
BUN SERPL-MCNC: 16 MG/DL (ref 7–25)
C3 SERPL-MCNC: 152 MG/DL (ref 83–193)
C4 SERPL-MCNC: 45 MG/DL (ref 15–57)
CALCIUM SERPL-MCNC: 9.8 MG/DL (ref 8.6–10.2)
CHLORIDE SERPL-SCNC: 105 MMOL/L (ref 98–110)
CO2 SERPL-SCNC: 26 MMOL/L (ref 20–32)
COLOR UR: YELLOW
CREAT SERPL-MCNC: 1.06 MG/DL (ref 0.5–0.99)
CREAT UR-MCNC: 106 MG/DL (ref 20–275)
CRP SERPL-MCNC: <3 MG/L
DSDNA AB SER-ACNC: <1 IU/ML
EGFRCR SERPLBLD CKD-EPI 2021: 66 ML/MIN/1.73M2
EOSINOPHIL # BLD AUTO: 108 CELLS/UL (ref 15–500)
EOSINOPHIL NFR BLD AUTO: 2.2 %
ERYTHROCYTE [DISTWIDTH] IN BLOOD BY AUTOMATED COUNT: 11.1 % (ref 11–15)
ERYTHROCYTE [SEDIMENTATION RATE] IN BLOOD BY WESTERGREN METHOD: 14 MM/H
GLUCOSE SERPL-MCNC: 113 MG/DL (ref 65–139)
GLUCOSE UR QL STRIP: NEGATIVE
HCT VFR BLD AUTO: 41.4 % (ref 35–45)
HGB BLD-MCNC: 13.4 G/DL (ref 11.7–15.5)
HGB UR QL STRIP: ABNORMAL
HYALINE CASTS #/AREA URNS LPF: ABNORMAL /LPF
KETONES UR QL STRIP: NEGATIVE
LEUKOCYTE ESTERASE UR QL STRIP: NEGATIVE
LYMPHOCYTES # BLD AUTO: 2210 CELLS/UL (ref 850–3900)
LYMPHOCYTES NFR BLD AUTO: 45.1 %
MCH RBC QN AUTO: 30.5 PG (ref 27–33)
MCHC RBC AUTO-ENTMCNC: 32.4 G/DL (ref 32–36)
MCV RBC AUTO: 94.3 FL (ref 80–100)
MONOCYTES # BLD AUTO: 446 CELLS/UL (ref 200–950)
MONOCYTES NFR BLD AUTO: 9.1 %
NEUTROPHILS # BLD AUTO: 2127 CELLS/UL (ref 1500–7800)
NEUTROPHILS NFR BLD AUTO: 43.4 %
NITRITE UR QL STRIP: NEGATIVE
PH UR STRIP: 6.5 [PH] (ref 5–8)
PLATELET # BLD AUTO: 274 THOUSAND/UL (ref 140–400)
PMV BLD REES-ECKER: 12 FL (ref 7.5–12.5)
POTASSIUM SERPL-SCNC: 5 MMOL/L (ref 3.5–5.3)
PROT SERPL-MCNC: 7.5 G/DL (ref 6.1–8.1)
PROT UR QL STRIP: NEGATIVE
PROT UR-MCNC: 7 MG/DL (ref 5–24)
PROT/CREAT UR: 0.07 MG/MG CREAT (ref 0.02–0.18)
PROT/CREAT UR: 66 MG/G CREAT (ref 24–184)
RBC # BLD AUTO: 4.39 MILLION/UL (ref 3.8–5.1)
RBC #/AREA URNS HPF: ABNORMAL /HPF
SERVICE CMNT-IMP: ABNORMAL
SODIUM SERPL-SCNC: 138 MMOL/L (ref 135–146)
SP GR UR STRIP: 1.01 (ref 1–1.03)
SQUAMOUS #/AREA URNS HPF: ABNORMAL /HPF
WBC # BLD AUTO: 4.9 THOUSAND/UL (ref 3.8–10.8)
WBC #/AREA URNS HPF: ABNORMAL /HPF

## 2025-07-23 ENCOUNTER — APPOINTMENT (OUTPATIENT)
Dept: RHEUMATOLOGY | Facility: CLINIC | Age: 45
End: 2025-07-23
Payer: COMMERCIAL

## 2025-07-23 VITALS
OXYGEN SATURATION: 98 % | HEIGHT: 67 IN | DIASTOLIC BLOOD PRESSURE: 69 MMHG | HEART RATE: 79 BPM | BODY MASS INDEX: 45.99 KG/M2 | SYSTOLIC BLOOD PRESSURE: 103 MMHG | WEIGHT: 293 LBS

## 2025-07-23 DIAGNOSIS — M35.00 SJOGREN'S SYNDROME, WITH UNSPECIFIED ORGAN INVOLVEMENT (MULTI): ICD-10-CM

## 2025-07-23 DIAGNOSIS — Z79.624 ENCOUNTER FOR LONG TERM CURRENT USE OF AZATHIOPRINE: ICD-10-CM

## 2025-07-23 DIAGNOSIS — M32.9 SYSTEMIC LUPUS ERYTHEMATOSUS, UNSPECIFIED SLE TYPE, UNSPECIFIED ORGAN INVOLVEMENT STATUS (MULTI): ICD-10-CM

## 2025-07-23 DIAGNOSIS — M35.05 SJOGREN'S SYNDROME WITH INFLAMMATORY ARTHRITIS: ICD-10-CM

## 2025-07-23 DIAGNOSIS — M48.061 NEURAL FORAMINAL STENOSIS OF LUMBAR SPINE: Primary | ICD-10-CM

## 2025-07-23 DIAGNOSIS — M87.00 AVASCULAR NECROSIS (MULTI): ICD-10-CM

## 2025-07-23 PROCEDURE — 99215 OFFICE O/P EST HI 40 MIN: CPT | Performed by: STUDENT IN AN ORGANIZED HEALTH CARE EDUCATION/TRAINING PROGRAM

## 2025-07-23 PROCEDURE — 3008F BODY MASS INDEX DOCD: CPT | Performed by: STUDENT IN AN ORGANIZED HEALTH CARE EDUCATION/TRAINING PROGRAM

## 2025-07-23 PROCEDURE — 1036F TOBACCO NON-USER: CPT | Performed by: STUDENT IN AN ORGANIZED HEALTH CARE EDUCATION/TRAINING PROGRAM

## 2025-07-23 RX ORDER — AZATHIOPRINE 50 MG/1
50 TABLET ORAL DAILY
Qty: 90 TABLET | Refills: 3 | Status: SHIPPED | OUTPATIENT
Start: 2025-07-23

## 2025-07-23 RX ORDER — CEVIMELINE HYDROCHLORIDE 30 MG/1
30 CAPSULE ORAL 2 TIMES DAILY
Qty: 180 CAPSULE | Refills: 3 | Status: SHIPPED | OUTPATIENT
Start: 2025-07-23

## 2025-07-23 RX ORDER — HYDROXYCHLOROQUINE SULFATE 200 MG/1
200 TABLET, FILM COATED ORAL 2 TIMES DAILY
Qty: 180 TABLET | Refills: 3 | Status: SHIPPED | OUTPATIENT
Start: 2025-07-23

## 2025-07-23 NOTE — PATIENT INSTRUCTIONS
Continue cevimiline, plaquenil, azathioprine    You can followup with me in 6 mo, with lab before

## 2025-07-28 ENCOUNTER — APPOINTMENT (OUTPATIENT)
Dept: PRIMARY CARE | Facility: CLINIC | Age: 45
End: 2025-07-28
Payer: COMMERCIAL

## 2025-07-28 VITALS
OXYGEN SATURATION: 95 % | WEIGHT: 293 LBS | SYSTOLIC BLOOD PRESSURE: 134 MMHG | HEART RATE: 80 BPM | DIASTOLIC BLOOD PRESSURE: 84 MMHG | HEIGHT: 67 IN | BODY MASS INDEX: 45.99 KG/M2

## 2025-07-28 DIAGNOSIS — Z00.00 ANNUAL PHYSICAL EXAM: Primary | ICD-10-CM

## 2025-07-28 DIAGNOSIS — E55.9 VITAMIN D DEFICIENCY: ICD-10-CM

## 2025-07-28 DIAGNOSIS — E66.01 MORBID OBESITY WITH BMI OF 50.0-59.9, ADULT (MULTI): ICD-10-CM

## 2025-07-28 DIAGNOSIS — M32.9 SYSTEMIC LUPUS ERYTHEMATOSUS, UNSPECIFIED SLE TYPE, UNSPECIFIED ORGAN INVOLVEMENT STATUS (MULTI): ICD-10-CM

## 2025-07-28 DIAGNOSIS — M35.05 SJOGREN SYNDROME WITH INFLAMMATORY ARTHRITIS: ICD-10-CM

## 2025-07-28 DIAGNOSIS — Z23 ENCOUNTER FOR IMMUNIZATION: ICD-10-CM

## 2025-07-28 DIAGNOSIS — Z12.31 VISIT FOR SCREENING MAMMOGRAM: ICD-10-CM

## 2025-07-28 DIAGNOSIS — F32.5 MAJOR DEPRESSIVE DISORDER WITH SINGLE EPISODE, IN FULL REMISSION: ICD-10-CM

## 2025-07-28 DIAGNOSIS — R30.0 DYSURIA: ICD-10-CM

## 2025-07-28 DIAGNOSIS — M54.16 LUMBAR RADICULOPATHY: ICD-10-CM

## 2025-07-28 DIAGNOSIS — N04.9 NEPHROTIC SYNDROME: ICD-10-CM

## 2025-07-28 DIAGNOSIS — M46.1 SACROILIITIS: ICD-10-CM

## 2025-07-28 DIAGNOSIS — Z12.11 ENCOUNTER FOR SCREENING FOR MALIGNANT NEOPLASM OF COLON: ICD-10-CM

## 2025-07-28 DIAGNOSIS — Z12.4 SCREENING FOR CERVICAL CANCER: ICD-10-CM

## 2025-07-28 PROCEDURE — 1036F TOBACCO NON-USER: CPT | Performed by: FAMILY MEDICINE

## 2025-07-28 PROCEDURE — 90471 IMMUNIZATION ADMIN: CPT | Performed by: FAMILY MEDICINE

## 2025-07-28 PROCEDURE — 90715 TDAP VACCINE 7 YRS/> IM: CPT | Performed by: FAMILY MEDICINE

## 2025-07-28 PROCEDURE — 99396 PREV VISIT EST AGE 40-64: CPT | Performed by: FAMILY MEDICINE

## 2025-07-28 PROCEDURE — 87626 HPV SEP HI-RSK TYP&POOL RSLT: CPT

## 2025-07-28 PROCEDURE — 88175 CYTOPATH C/V AUTO FLUID REDO: CPT

## 2025-07-28 PROCEDURE — 3008F BODY MASS INDEX DOCD: CPT | Performed by: FAMILY MEDICINE

## 2025-07-28 RX ORDER — LISINOPRIL 20 MG/1
20 TABLET ORAL DAILY
Qty: 90 TABLET | Refills: 1 | Status: SHIPPED | OUTPATIENT
Start: 2025-07-28 | End: 2026-01-24

## 2025-07-28 RX ORDER — SERTRALINE HYDROCHLORIDE 50 MG/1
50 TABLET, FILM COATED ORAL DAILY
Qty: 90 TABLET | Refills: 1 | Status: SHIPPED | OUTPATIENT
Start: 2025-07-28 | End: 2026-01-24

## 2025-07-28 ASSESSMENT — PATIENT HEALTH QUESTIONNAIRE - PHQ9
2. FEELING DOWN, DEPRESSED OR HOPELESS: NOT AT ALL
SUM OF ALL RESPONSES TO PHQ9 QUESTIONS 1 AND 2: 0
1. LITTLE INTEREST OR PLEASURE IN DOING THINGS: NOT AT ALL

## 2025-07-28 NOTE — PROGRESS NOTES
"Subjective   Patient ID: Amanda Beverly is a 45 y.o. female who presents for Annual Exam.    Last Annual Physical: April 2024  Last Dental Visit: in a while  Last Eye exam: May 2025  Hearing Concerns: No   Diet: Well- balanced   Exercise Routine: No exercise routine       HPI     The patient reports that she is taking lisinopril for her hypertension, Zoloft for depression, Imuran, cetirizine, gabapentin, Topamax for lower back pain and Plaquenil as well as Evoxac for SLE and Sjrojen's syndrome. She is taking these medications as prescribed and is tolerating them well.     No depression over the last few weeks.  She reports she is trying to lose weight through diet and exercise but is not seeing results.     She has been experiencing lower abdominal pain and wonders if she has a urinary tract infection.      Review of Systems  Constitutional: No fever or chills, No Night Sweats  Eyes: No Blurry Vision or Eye sight problems  ENT: No Nasal Discharge, Hoarseness, sore throat  Cardiovascular: no chest pain, no palpitations and no syncope.   Respiratory: no cough, no shortness of breath during exertion and no shortness of breath at rest.   Gastrointestinal: no abdominal pain, no nausea and no vomiting.   : No vaginal discharge, +pelvic pain, burning with urination, no blood in urine or stools  Skin: No Skin rashes or Lesions +lump in vaginal area, reduced  Neuro: No Headache, no dizziness or Numbness or tingling  Psych: No Anxiety, depression or sleeping problems  Heme: No Easy bleeding or brusing.     Objective   /84   Pulse 80   Ht 1.702 m (5' 7\")   Wt (!) 159 kg (350 lb)   LMP 07/10/2025   SpO2 95%   BMI 54.82 kg/m²     Physical Exam  Constitutional: Alert and in no acute distress. Well developed, well nourished.   Head and Face: Head and face: Normal.    Eyes: Normal external exam. Pupils were equal in size, round, reactive to light (PERRL) with normal accommodation and extraocular movements intact " (EOMI).   Ears, Nose, Mouth, and Throat: External inspection of ears and nose: Normal.  Hearing: Normal.  Nasal mucosa, septum, and turbinates: Normal.  Lips, teeth, and gums: Normal.  Oropharynx: Normal.   Neck: No neck mass was observed. Supple. Thyroid not enlarged and there were no palpable thyroid nodules.   Cardiovascular: Heart rate and rhythm were normal, normal S1 and S2. Pedal pulses: Normal. No peripheral edema.   Pulmonary: No respiratory distress. Clear bilateral breath sounds.   Breast: Normal Appearance, No Masses or lumps palpated  Abdomen: Soft nontender; no abdominal mass palpated. Normal bowel sounds. No organomegaly.   : Cervix and vagina normal.  Musculoskeletal: No joint swelling seen, normal movements of all extremities. Range of motion: Normal.  Muscle strength/tone: Normal.    Skin: Normal skin color and pigmentation, normal skin turgor, and no rash.   Neurologic: Deep tendon reflexes were 2+ and symmetric.   Psychiatric: Judgment and insight: Intact. Mood and affect: Normal.  Lymphatic: No cervical lymphadenopathy. Palpation of lymph nodes in axillae: Normal.  Palpation of lymph nodes in groin: Normal.    Lab Results   Component Value Date    WBC 4.9 07/16/2025    HGB 13.4 07/16/2025    HCT 41.4 07/16/2025     07/16/2025    CHOL 152 04/04/2024    TRIG 66 04/04/2024    HDL 43.0 04/04/2024    ALT 10 07/16/2025    AST 14 07/16/2025     07/16/2025    K 5.0 07/16/2025     07/16/2025    CREATININE 1.06 (H) 07/16/2025    BUN 16 07/16/2025    CO2 26 07/16/2025    TSH 1.29 04/04/2024    HGBA1C 4.9 04/04/2024       XR lumbar spine complete 4+ views  Narrative: Interpreted By:  Immanuel Crandall,   STUDY:  XR LUMBAR SPINE COMPLETE 4+ VIEWS      INDICATION:  Signs/Symptoms:lumbar pain.      COMPARISON:  December 27, 2019      ACCESSION NUMBER(S):  [id number]      ORDERING CLINICIAN:  KELLY HERNÁNDEZ      FINDINGS:  Focally advanced L4-5 discogenic degenerative disease with  extensive  vertebral sclerosis, progressed from prior study. No subluxation or  pathologic motion. No evidence of fracture.      Impression: Advanced L4-5 disc disease progressed from the previous exam.      Signed by: Immanuel Crandall 2/11/2025 7:03 PM  Dictation workstation:   [id number]      Assessment/Plan   Diagnoses and all orders for this visit:  Annual physical exam  -     Hemoglobin A1C; Future  -     Lipid Panel; Future  -     TSH with reflex to Free T4 if abnormal; Future  -     Vitamin B12; Future  Major depressive disorder with single episode, in full remission  Systemic lupus erythematosus, unspecified SLE type, unspecified organ involvement status (Multi)  -     Albumin-Creatinine Ratio, Urine Random; Future  Sjogren syndrome with inflammatory arthritis  Lumbar radiculopathy  Vitamin D deficiency  -     Vitamin D 25-Hydroxy,Total (for eval of Vitamin D levels); Future  Dysuria  -     Urinalysis with Reflex Microscopic; Future  -     Urine Culture; Future  Morbid obesity with BMI of 50.0-59.9, adult (Multi)  -     Referral to Endocrinology; Future  Screening for cervical cancer  -     THINPREP PAP TEST  Visit for screening mammogram  -     BI mammo bilateral screening tomosynthesis; Future  Encounter for screening for malignant neoplasm of colon  -     Cologuard® colon cancer screening; Future  Encounter for immunization  -     Tdap vaccine, age 7 years and older  (BOOSTRIX)  Nephrotic syndrome  -     lisinopril 20 mg tablet; Take 1 tablet (20 mg) by mouth once daily.  Sacroiliitis  -     sertraline (Zoloft) 50 mg tablet; Take 1 tablet (50 mg) by mouth once daily.        Dear Amanda Beverly     It was my pleasure to take care of you today in the office. Below are the things we discussed today:    1. Immunizations: Yearly Flu shot is recommended. Up-to-date          a: COVID: Please get booster from the pharmacy          b: Tetanus: Done today         c: Shingrix: Please follow up for it or get it from the  pharmacy         e: Prevnar: up-to-date     2. Blood Work: Ordered   3. Seen your dentist twice a year  4. Yearly Eye exam is recommended     5. BMI: Obese   6: Diet recommendations:   Eat Clean, Try to have as many home cooked meals as possible  Avoid processed foods which contain excess calories, sugar, and sodium.     7. Exercise recommendations:   150 minutes a week to maintain your weight      If you have to lose weight, you need a better diet and exercise plan.      8. Supplements recommended:  a - Calcium 600 mg up to twice a day to get a total of 1200 mg. Each 8 oz of milk or yogurt or 1 oz of cheese, 1 Banana, 1 serving of green Leafy vegetable has about 300 mg of Calcium, so you may subtract that amount. Calcium citrate is the only acceptable supplement to take if you take an acid suppressing medication like Prilosec; otherwise Calcium carbonate is acceptable too (It can cause Constipation).   b - Vitamin D - 2000 IU daily      9. Please get your Living will / Advance directive completed if you do not have one already. Please make sure our office has a copy of the latest one.      10. Colonoscopy: Ordered Cologuard  11. Mammogram: Overdue. Ordered.   12. PAP: Last PAP Nov 2019, cytology and HPV were negative. Collected today.  13. Skin Check: Please see Dermatology once a year for a Skin Check.      14.  Morbid obesity:  Referral provided to Endocrinology.     16. SLE and Sjrogen's syndrome: Continue Plaquenil and Evoxac. Following with Dr. Navarro.      17. Nephrotic syndrome: Well- controlled. Continue lisinopril.     18. Depression: Continue Zoloft.      19. Lower back pain: Continue Topamax.    20. Dysuria: Urinalysis ordered.     Follow up in 6 months.    Follow up in one year for a Physical. Please call the office before your Physical to see if you need blood work completed prior to your physical.     Please call me if any questions arise from now until your next visit. I will call you after I am  done seeing patients. A Doctor is always available by phone when the office is closed. Please feel free to call for help with any problem that you feel shouldn't wait until the office re-opens.     I, Bee Arellano MD, attest that this note for 7/28/2025 accurately reflects documentation that my scribe Phani Boland, made at my direction in my capacity as Bee Arellano MD when I treated Amanda Beverly.      Scribe Attestation  By signing my name below, IPhani, Scribe   attest that this documentation has been prepared under the direction and in the presence of Bee Arellano MD.

## 2025-07-30 LAB
ALBUMIN/CREAT UR: 8 MG/G CREAT
APPEARANCE UR: CLEAR
BACTERIA #/AREA URNS HPF: ABNORMAL /HPF
BACTERIA UR CULT: ABNORMAL
BILIRUB UR QL STRIP: NEGATIVE
COLOR UR: YELLOW
CREAT UR-MCNC: 123 MG/DL (ref 20–275)
GLUCOSE UR QL STRIP: NEGATIVE
HGB UR QL STRIP: NEGATIVE
HYALINE CASTS #/AREA URNS LPF: ABNORMAL /LPF
KETONES UR QL STRIP: NEGATIVE
LEUKOCYTE ESTERASE UR QL STRIP: ABNORMAL
MICROALBUMIN UR-MCNC: 1 MG/DL
NITRITE UR QL STRIP: POSITIVE
PH UR STRIP: 7 [PH] (ref 5–8)
PROT UR QL STRIP: NEGATIVE
RBC #/AREA URNS HPF: ABNORMAL /HPF
SERVICE CMNT-IMP: ABNORMAL
SP GR UR STRIP: 1.02 (ref 1–1.03)
SQUAMOUS #/AREA URNS HPF: ABNORMAL /HPF
WBC #/AREA URNS HPF: ABNORMAL /HPF

## 2025-08-08 ENCOUNTER — OFFICE VISIT (OUTPATIENT)
Dept: PAIN MEDICINE | Facility: HOSPITAL | Age: 45
End: 2025-08-08
Payer: COMMERCIAL

## 2025-08-08 DIAGNOSIS — M48.062 SPINAL STENOSIS OF LUMBAR REGION WITH NEUROGENIC CLAUDICATION: Primary | ICD-10-CM

## 2025-08-08 DIAGNOSIS — M79.2 NEUROPATHIC PAIN OF LEFT LOWER EXTREMITY: ICD-10-CM

## 2025-08-08 DIAGNOSIS — M54.16 LUMBAR RADICULOPATHY: ICD-10-CM

## 2025-08-08 PROCEDURE — 99213 OFFICE O/P EST LOW 20 MIN: CPT | Performed by: PAIN MEDICINE

## 2025-08-08 RX ORDER — PREGABALIN 50 MG/1
CAPSULE ORAL
Qty: 63 CAPSULE | Refills: 0 | Status: SHIPPED | OUTPATIENT
Start: 2025-08-08 | End: 2025-09-05

## 2025-08-08 ASSESSMENT — PAIN SCALES - GENERAL: PAINLEVEL_OUTOF10: 7

## 2025-08-08 NOTE — PROGRESS NOTES
Patient ID: Amanda Beverly is a 45 y.o. female with a past medical history of Depression, glomerulonephritis, and SLE who presents for follow-up of Foot Pain.     Patient was last seen with Dr. Esparza on 1/27/2025 at which time she underwent a left L4-L5 TFESI. Reports this helped approximately 80% pain relief and it continues to help, but endorses continued numbness and tingling of the left foot. Located in the webspace between the first and second toe. Sensitivity makes it difficult to wear shoes on the left foot. Reports her radicular symptoms extending down the left leg have resolved, just endorses this residual left dorsal foot discomfort.     Treatment To Date:  - Physical therapy: previously completed within the past year     - Home exercise program after PT: none   - Previous injections/interventions: left L4-L5 TFESI on 1/27/2025   - Medications: Gabapentin, Topamax       Patient denies bowel/bladder incontinence, denies fever, denies unintentional weight loss, denies clumsiness of hands, feet, or dropping things.  Denies any constitutional or myelopathic symptomatology.                                   Imaging:  === 11/09/24 ===    MR LUMBAR SPINE WO CONTRAST    - Impression -  Minimal interval increased disc height loss at L4-L5. Unchanged size  and extent of a moderate-sized left subarticular disc extrusion  effacing the left lateral recess and compressing the descending left  L5 nerve root. Additional disc bulge and endplate spurring results in  severe left/moderate right neural foraminal narrowing with  compression of the exiting left L4 nerve root, also similar in  appearance.    No additional spinal canal stenosis or neural foraminal narrowing at  the remaining lumbar levels.    === 02/10/25 ===    XR LUMBAR SPINE COMPLETE 4+ VIEWS    - Impression -  Advanced L4-5 disc disease progressed from the previous exam.    Lab Results   Component Value Date    HGBA1C 4.9 04/04/2024       Current Outpatient  Medications   Medication Instructions    azaTHIOprine (IMURAN) 50 mg, oral, Daily    cetirizine HCl (ZYRTEC ORAL) 1 tablet, Daily PRN    cevimeline (EVOXAC) 30 mg, oral, 2 times daily    gabapentin (NEURONTIN) 600 mg, oral, 3 times daily    hydroxychloroquine (PLAQUENIL) 200 mg, oral, 2 times daily    lisinopril 20 mg, oral, Daily    sertraline (ZOLOFT) 50 mg, oral, Daily    topiramate (TOPAMAX) 100 mg, oral, 2 times daily        Medical History[1]     Surgical History[2]     Family History[3]     RX Allergies[4]     Objective     There were no vitals filed for this visit.     Physical Exam    GENERAL EXAM  Vital Signs: Vital signs to include heart rate, respiration rate, blood pressure, and temperature were reviewed.  General Appearance:  Awake, alert, healthy appearing, well developed, No acute distress.  Head: Normocephalic without evidence of head injury.  Neck: The appearance of the neck was normal without swelling with a midline trachea.  Eyes: The eyelids and eyebrows exhibited no abnormalities.  Pupils were not pin-point.  Sclera was without icterus.  Lungs: Respiration rhythm and depth was normal.  Respiratory movements were normal without labored breathing.  Cardiovascular: No peripheral edema was present.    Neurological: Patient was oriented to time, place, and person.  Speech was normal.  Balance, gait, and stance were unremarkable.    Psychiatric: Appearance was normal with appropriate dress.  Mood was euthymic and affect was normal.  Skin: Affected regions were without ecchymosis or skin lesions.    Musculoskeletal Exam:  RLE strength: 5/5 hip flexors, 5/5 knee extensors, 5/5 ankle dorsiflexors, 5/5 EHL, 5/5 ankle plantar flexors   LLE strength: 5/5 hip flexors, 5/5 knee extensors, 5/5 ankle dorsiflexors, 5/5 EHL, 5/5 ankle plantar flexors   No evidence of hyperreflexia, 2+ reflexes of bilateral Patella and Achilles  Diminished light touch and hyperalgesia of the first webspace of the left foot,  otherwise intact to remaining bilateral extremities    Special Tests:   Straight Leg Raise negative  Positive Tinel's over the left fibular head with reproduction of left dorsal foot paraesthesias    Assessment/Plan   Problem List Items Addressed This Visit           ICD-10-CM    Lumbar radiculopathy M54.16    Relevant Orders    EMG & nerve conduction     Other Visit Diagnoses         Codes      Spinal stenosis of lumbar region with neurogenic claudication    -  Primary M48.062      BMI 50.0-59.9, adult (Multi)     Z68.43          Amanda Beverly is a 45 y.o. female with a past medical history of Depression, glomerulonephritis, and SLE who presents for follow-up of low back pain and left foot pain.  Patient had a left L4-L5 transforaminal epidural steroid injection on 1/27/2025.  This resolved her left low back and left lower extremity pain, however she now has numbness and tingling overlying the left dorsum of the foot.  Patient does have positive Tinel's at the fibular head with reproduction of paresthesias of the left webspace between the 1st and 2nd digit.  Patient also has diminished light touch and hyperalgesia to pinprick on the left.  We will order an EMG/NCS to further evaluate for possible concomitant left deep fibular neuropathy versus lumbar radiculopathy.  Patient endorses somnolence with gabapentin, currently only tolerating 600 mg twice daily.  We will start Lyrica 50 mg at bedtime for 1 week with the gabapentin 600 twice daily, then we will increase the Lyrica 50 mg to twice daily and decrease the gabapentin to 600 daily for 7 days.  Patient to then discontinue gabapentin and continue the Lyrica 50 mg 3 times daily.  Agree with Topamax 100 mg twice daily.  Of note, patient is on Zoloft so we will avoid any SSRIs or SNRIs in the future.  Depending on results of the EMG/NCS, can consider referral to podiatry.  Patient denies any other further questions or concerns at this time.    Plan:   - Referral for  EMG and nerve conduction study to evaluate for possible left deep fibular neuropathy vs. Lumbar radiculopathy. Consider referral to podiatry depending on results  - Patient to start Lyrica 50mg at bedtime with the Gabapentin 600mg BID for one week, then decrease Gabapentin to 600mg Daily and increase Lyrica to 50mg BID for one week, then discontinue Gabapentin and take the Lyrica 50mg three times daily  - Continue with Topamax 100mg BID  - On Zoloft, avoid other SSRIs or SNRIs  - Follow-up as needed    Joi Anderson DO   Chronic Pain Fellow    Patient seen and examined with attending, Dr. Borges, who agrees with assessment and plan.        [1]   Past Medical History:  Diagnosis Date    Acute candidiasis of vulva and vagina 08/07/2019    Vaginal candidiasis    Acute vaginitis 03/01/2022    Bacterial vaginosis    Acute vaginitis 07/03/2018    Bacterial vaginosis    Acute vaginitis 08/07/2019    Bacterial vaginosis    Anemia, unspecified 08/10/2015    Low hemoglobin    Benign paroxysmal vertigo, unspecified ear 08/20/2014    Benign paroxysmal positional vertigo    Body mass index (BMI) 50.0-59.9, adult (Multi) 09/03/2021    Body mass index (BMI) of 50.0 to 59.9 in adult    Contact with and (suspected) exposure to covid-19 07/07/2020    Suspected COVID-19 virus infection    Dietary counseling and surveillance 05/21/2018    Dietary counseling    Dorsalgia, unspecified 04/05/2018    Acute back pain    Dysuria 03/08/2015    Dysuria    Elevated blood-pressure reading, without diagnosis of hypertension 10/07/2017    Elevated blood pressure reading    Encounter for care and examination of lactating mother 12/28/2015    Postpartum care and examination of lactating mother    Encounter for screening for human papillomavirus (HPV)     Screening for HPV (human papillomavirus)    Encounter for screening for lipoid disorders 04/21/2017    Screening for lipid disorders    Encounter for screening for malignant neoplasm of cervix  03/03/2015    Screening for cervical cancer    Exercise counseling 05/21/2018    Exercise counseling    Follicular disorder, unspecified 05/06/2020    Acute folliculitis    Long term (current) use of systemic steroids 01/29/2016    On prednisone therapy    Morbid (severe) obesity due to excess calories (Multi) 12/12/2019    Obesity, morbid, BMI 50 or higher    Morbid (severe) obesity due to excess calories (Multi) 06/20/2019    Morbid obesity with BMI of 40.0-44.9, adult    Morbid (severe) obesity due to excess calories (Multi) 09/03/2021    Obesity, morbid, BMI 50 or higher    Nasal congestion     Head congestion    Other conditions influencing health status     Arthritis    Other conditions influencing health status     History of burning on urination    Other conditions influencing health status 04/05/2015    History of pregnancy    Other long term (current) drug therapy 09/26/2016    Encounter for long-term current use of high risk medication    Other specified diseases and conditions complicating pregnancy ([facility]) 04/28/2016    Systemic lupus complicating pregnancy    Other specified noninflammatory disorders of vagina     Vaginal irritation    Other specified postprocedural states     History of Papanicolaou smear    Other specified soft tissue disorders 02/08/2015    Right leg swelling    Other symptoms and signs involving the genitourinary system     Sensation of pressure in bladder area    Pain in unspecified ankle and joints of unspecified foot 05/03/2022    Ankle pain    Pain in unspecified foot 08/05/2016    Pains, foot    Pelvic and perineal pain 11/14/2019    Suprapubic pain    Pelvic and perineal pain 06/26/2018    Pelvic pain in female    Pelvic and perineal pain 07/23/2020    Pelvic pressure in female    Personal history of gestational diabetes 12/28/2015    History of gestational diabetes mellitus (GDM)    Personal history of other (healed) physical injury and trauma 09/03/2021    History of  strain    Personal history of other diseases of the circulatory system 02/09/2018    History of hypertension    Personal history of other diseases of the digestive system 06/25/2015    History of constipation    Personal history of other diseases of the digestive system 02/18/2016    History of constipation    Personal history of other diseases of the female genital tract 03/19/2021    History of vaginal discharge    Personal history of other diseases of the female genital tract 08/06/2019    History of vaginal discharge    Personal history of other diseases of the female genital tract 08/05/2016    History of vaginal discharge    Personal history of other diseases of the female genital tract 08/05/2021    History of vaginal pruritus    Personal history of other diseases of the musculoskeletal system and connective tissue 02/06/2020    History of low back pain    Personal history of other diseases of the musculoskeletal system and connective tissue 03/26/2016    History of joint pain    Personal history of other diseases of the respiratory system 08/22/2016    History of acute sinusitis    Personal history of other drug therapy 10/05/2017    History of influenza vaccination    Personal history of other drug therapy 09/25/2015    History of influenza vaccination    Personal history of other endocrine, nutritional and metabolic disease 01/28/2016    History of hyperglycemia    Personal history of other infectious and parasitic diseases 07/27/2020    History of trichomonal vaginitis    Personal history of other specified conditions 01/29/2016    History of edema    Personal history of other specified conditions 06/26/2018    History of incontinence of feces    Personal history of other specified conditions 08/06/2019    History of dysuria    Personal history of other specified conditions 02/18/2016    History of urinary frequency    Personal history of other specified conditions 06/20/2016    History of dysuria     Personal history of other specified conditions 08/17/2020    History of dysuria    Personal history of other specified conditions 01/07/2015    History of paresthesia    Personal history of other specified conditions 08/05/2021    History of abdominal pain    Personal history of other specified conditions 12/23/2021    History of nasal congestion    Personal history of other specified conditions     History of shortness of breath    Personal history of urinary (tract) infections 12/28/2016    History of urinary tract infection    Personal history of urinary (tract) infections 12/29/2016    History of urinary tract infection    Polyphagia 05/11/2021    Polyphagia    Proteinuria, unspecified 08/21/2020    Proteinuria    Radiculopathy, lumbar region 02/10/2020    Acute lumbar radiculopathy    Unspecified urinary incontinence 05/23/2018    Urinary incontinence in female    Urge incontinence 06/26/2018    Urge incontinence of urine    Urinary tract infection, site not specified 08/07/2019    Acute lower UTI    Urinary tract infection, site not specified 08/18/2020    Acute lower UTI   [2]   Past Surgical History:  Procedure Laterality Date    ECTOPIC PREGNANCY SURGERY  10/01/2013    Surgical Excision Of Tubal Pregnancy    OTHER SURGICAL HISTORY  10/01/2013    Abdominal Surgery    OTHER SURGICAL HISTORY  05/23/2018    Anal Sphincterectomy    OTHER SURGICAL HISTORY  08/20/2014    Laparoscopic Excision Of Ectopic Pregnancy And Salpingectomy    OTHER SURGICAL HISTORY  08/20/2014    Ovarian Cystectomy   [3]   Family History  Problem Relation Name Age of Onset    Diabetes Mother      Other (Breast lump) Mother          benign    Hypotension Father      Diabetes Sister      Hypertension Brother          borderline   [4]   Allergies  Allergen Reactions    House Dust Unknown    Labetalol Other     Muscles in legs tightened     Pollen Extracts Unknown

## 2025-08-13 ENCOUNTER — TELEPHONE (OUTPATIENT)
Dept: PRIMARY CARE | Facility: CLINIC | Age: 45
End: 2025-08-13
Payer: COMMERCIAL

## 2025-08-13 DIAGNOSIS — N30.00 ACUTE CYSTITIS WITHOUT HEMATURIA: Primary | ICD-10-CM

## 2025-08-14 RX ORDER — AMOXICILLIN AND CLAVULANATE POTASSIUM 875; 125 MG/1; MG/1
875 TABLET, FILM COATED ORAL 2 TIMES DAILY
Qty: 14 TABLET | Refills: 0 | Status: SHIPPED | OUTPATIENT
Start: 2025-08-14 | End: 2025-08-21

## 2025-09-11 ENCOUNTER — APPOINTMENT (OUTPATIENT)
Dept: RADIOLOGY | Facility: CLINIC | Age: 45
End: 2025-09-11
Payer: COMMERCIAL

## 2025-10-14 ENCOUNTER — APPOINTMENT (OUTPATIENT)
Dept: PRIMARY CARE | Facility: CLINIC | Age: 45
End: 2025-10-14
Payer: COMMERCIAL

## 2026-01-22 ENCOUNTER — APPOINTMENT (OUTPATIENT)
Dept: RHEUMATOLOGY | Facility: CLINIC | Age: 46
End: 2026-01-22
Payer: COMMERCIAL

## 2026-01-29 ENCOUNTER — APPOINTMENT (OUTPATIENT)
Dept: PRIMARY CARE | Facility: CLINIC | Age: 46
End: 2026-01-29
Payer: COMMERCIAL